# Patient Record
Sex: MALE | Race: WHITE | Employment: OTHER | URBAN - METROPOLITAN AREA
[De-identification: names, ages, dates, MRNs, and addresses within clinical notes are randomized per-mention and may not be internally consistent; named-entity substitution may affect disease eponyms.]

---

## 2017-04-21 PROBLEM — R19.7 DIARRHEA: Status: ACTIVE | Noted: 2017-04-21

## 2017-04-21 PROBLEM — E11.9 DIABETES MELLITUS (HCC): Status: ACTIVE | Noted: 2017-04-21

## 2017-04-21 PROBLEM — M06.9 RHEUMATOID ARTHRITIS (HCC): Status: ACTIVE | Noted: 2017-04-21

## 2017-04-21 PROBLEM — N18.30 CKD (CHRONIC KIDNEY DISEASE), STAGE III (HCC): Status: ACTIVE | Noted: 2017-04-21

## 2017-04-21 PROBLEM — J18.9 PNEUMONIA: Status: ACTIVE | Noted: 2017-04-21

## 2017-05-19 PROBLEM — R11.2 NAUSEA AND VOMITING: Status: ACTIVE | Noted: 2017-05-19

## 2017-05-19 PROBLEM — E86.0 DEHYDRATION: Status: ACTIVE | Noted: 2017-05-19

## 2017-05-19 PROBLEM — M62.82 RHABDOMYOLYSIS: Status: ACTIVE | Noted: 2017-05-19

## 2017-05-19 PROBLEM — J69.0 ASPIRATION PNEUMONIA (HCC): Status: ACTIVE | Noted: 2017-05-19

## 2017-05-23 PROBLEM — R13.10 DYSPHAGIA: Status: ACTIVE | Noted: 2017-05-23

## 2017-05-24 PROBLEM — K29.71 HEMORRHAGIC GASTRITIS: Status: ACTIVE | Noted: 2017-05-24

## 2017-05-24 PROBLEM — K21.00 HIATAL HERNIA WITH GERD AND ESOPHAGITIS: Status: ACTIVE | Noted: 2017-05-24

## 2017-05-24 PROBLEM — K29.80 DUODENITIS: Status: ACTIVE | Noted: 2017-05-24

## 2017-05-24 PROBLEM — K21.9 GERD (GASTROESOPHAGEAL REFLUX DISEASE): Status: ACTIVE | Noted: 2017-05-24

## 2017-05-24 PROBLEM — K44.9 HIATAL HERNIA WITH GERD AND ESOPHAGITIS: Status: ACTIVE | Noted: 2017-05-24

## 2018-05-14 ENCOUNTER — HOSPITAL ENCOUNTER (OUTPATIENT)
Age: 83
Discharge: HOME | End: 2018-05-16
Payer: MEDICARE

## 2018-05-14 ENCOUNTER — HOSPITAL ENCOUNTER (OUTPATIENT)
Dept: GENERAL RADIOLOGY | Age: 83
Discharge: HOME | End: 2018-05-16
Payer: MEDICARE

## 2018-05-14 DIAGNOSIS — R05.9 COUGH: ICD-10-CM

## 2018-05-14 PROCEDURE — 71046 X-RAY EXAM CHEST 2 VIEWS: CPT

## 2018-05-23 ENCOUNTER — HOSPITAL ENCOUNTER (OUTPATIENT)
Dept: CT IMAGING | Age: 83
Discharge: HOME | End: 2018-05-25
Payer: MEDICARE

## 2018-05-23 DIAGNOSIS — R91.8 LUNG MASS: ICD-10-CM

## 2018-05-23 PROCEDURE — 71270 CT THORAX DX C-/C+: CPT

## 2018-05-23 PROCEDURE — 6360000004 HC RX CONTRAST MEDICATION: Performed by: RADIOLOGY

## 2018-05-23 RX ORDER — SODIUM CHLORIDE 0.9 % (FLUSH) 0.9 %
10 SYRINGE (ML) INJECTION PRN
Status: DISCONTINUED | OUTPATIENT
Start: 2018-05-23 | End: 2018-05-26 | Stop reason: HOSPADM

## 2018-05-23 RX ADMIN — IOPAMIDOL 90 ML: 755 INJECTION, SOLUTION INTRAVENOUS at 16:26

## 2018-09-26 PROBLEM — E86.0 DEHYDRATION: Status: RESOLVED | Noted: 2017-05-19 | Resolved: 2018-09-26

## 2018-10-15 ENCOUNTER — HOSPITAL ENCOUNTER (OUTPATIENT)
Age: 83
Discharge: HOME | End: 2018-10-17
Payer: MEDICARE

## 2018-10-15 LAB — PROSTATE SPECIFIC ANTIGEN: 2.12 NG/ML (ref 0–4)

## 2018-10-15 PROCEDURE — G0103 PSA SCREENING: HCPCS

## 2019-01-18 ENCOUNTER — HOSPITAL ENCOUNTER (OUTPATIENT)
Age: 84
Discharge: HOME | End: 2019-01-18
Payer: COMMERCIAL

## 2019-01-18 LAB
ALBUMIN SERPL-MCNC: 3.8 G/DL (ref 3.5–5.2)
ALP BLD-CCNC: 76 U/L (ref 40–129)
ALT SERPL-CCNC: 17 U/L (ref 0–40)
ANION GAP SERPL CALCULATED.3IONS-SCNC: 15 MMOL/L (ref 7–16)
AST SERPL-CCNC: 18 U/L (ref 0–39)
BASOPHILS ABSOLUTE: 0.03 E9/L (ref 0–0.2)
BASOPHILS RELATIVE PERCENT: 0.2 % (ref 0–2)
BILIRUB SERPL-MCNC: 0.4 MG/DL (ref 0–1.2)
BUN BLDV-MCNC: 27 MG/DL (ref 8–23)
CALCIUM SERPL-MCNC: 9.3 MG/DL (ref 8.6–10.2)
CHLORIDE BLD-SCNC: 99 MMOL/L (ref 98–107)
CHOLESTEROL, TOTAL: 142 MG/DL (ref 0–199)
CO2: 26 MMOL/L (ref 22–29)
CREAT SERPL-MCNC: 1.2 MG/DL (ref 0.7–1.2)
EOSINOPHILS ABSOLUTE: 0.01 E9/L (ref 0.05–0.5)
EOSINOPHILS RELATIVE PERCENT: 0.1 % (ref 0–6)
GFR AFRICAN AMERICAN: >60
GFR NON-AFRICAN AMERICAN: 58 ML/MIN/1.73
GLUCOSE BLD-MCNC: 108 MG/DL (ref 74–99)
HBA1C MFR BLD: 7.2 % (ref 4–5.6)
HCT VFR BLD CALC: 47.4 % (ref 37–54)
HDLC SERPL-MCNC: 71 MG/DL
HEMOGLOBIN: 15.3 G/DL (ref 12.5–16.5)
IMMATURE GRANULOCYTES #: 0.08 E9/L
IMMATURE GRANULOCYTES %: 0.5 % (ref 0–5)
LDL CHOLESTEROL CALCULATED: 53 MG/DL (ref 0–99)
LYMPHOCYTES ABSOLUTE: 1.67 E9/L (ref 1.5–4)
LYMPHOCYTES RELATIVE PERCENT: 10.4 % (ref 20–42)
MCH RBC QN AUTO: 30.1 PG (ref 26–35)
MCHC RBC AUTO-ENTMCNC: 32.3 % (ref 32–34.5)
MCV RBC AUTO: 93.1 FL (ref 80–99.9)
MONOCYTES ABSOLUTE: 0.98 E9/L (ref 0.1–0.95)
MONOCYTES RELATIVE PERCENT: 6.1 % (ref 2–12)
NEUTROPHILS ABSOLUTE: 13.28 E9/L (ref 1.8–7.3)
NEUTROPHILS RELATIVE PERCENT: 82.7 % (ref 43–80)
PDW BLD-RTO: 13.3 FL (ref 11.5–15)
PLATELET # BLD: 276 E9/L (ref 130–450)
PMV BLD AUTO: 10.4 FL (ref 7–12)
POTASSIUM SERPL-SCNC: 4.3 MMOL/L (ref 3.5–5)
RBC # BLD: 5.09 E12/L (ref 3.8–5.8)
SODIUM BLD-SCNC: 140 MMOL/L (ref 132–146)
TOTAL PROTEIN: 6.4 G/DL (ref 6.4–8.3)
TRIGL SERPL-MCNC: 88 MG/DL (ref 0–149)
TSH SERPL DL<=0.05 MIU/L-ACNC: 1.97 UIU/ML (ref 0.27–4.2)
VLDLC SERPL CALC-MCNC: 18 MG/DL
WBC # BLD: 16.1 E9/L (ref 4.5–11.5)

## 2019-01-18 PROCEDURE — 85025 COMPLETE CBC W/AUTO DIFF WBC: CPT

## 2019-01-18 PROCEDURE — 80061 LIPID PANEL: CPT

## 2019-01-18 PROCEDURE — 84443 ASSAY THYROID STIM HORMONE: CPT

## 2019-01-18 PROCEDURE — 36415 COLL VENOUS BLD VENIPUNCTURE: CPT

## 2019-01-18 PROCEDURE — 80053 COMPREHEN METABOLIC PANEL: CPT

## 2019-01-18 PROCEDURE — 83036 HEMOGLOBIN GLYCOSYLATED A1C: CPT

## 2019-02-04 ENCOUNTER — HOSPITAL ENCOUNTER (OUTPATIENT)
Age: 84
Discharge: HOME | End: 2019-02-04
Payer: COMMERCIAL

## 2019-02-04 LAB
BASOPHILS ABSOLUTE: 0.06 E9/L (ref 0–0.2)
BASOPHILS RELATIVE PERCENT: 0.5 % (ref 0–2)
EOSINOPHILS ABSOLUTE: 0.35 E9/L (ref 0.05–0.5)
EOSINOPHILS RELATIVE PERCENT: 3 % (ref 0–6)
HCT VFR BLD CALC: 48.6 % (ref 37–54)
HEMOGLOBIN: 15.4 G/DL (ref 12.5–16.5)
IMMATURE GRANULOCYTES #: 0.04 E9/L
IMMATURE GRANULOCYTES %: 0.3 % (ref 0–5)
LYMPHOCYTES ABSOLUTE: 0.88 E9/L (ref 1.5–4)
LYMPHOCYTES RELATIVE PERCENT: 7.6 % (ref 20–42)
MCH RBC QN AUTO: 30 PG (ref 26–35)
MCHC RBC AUTO-ENTMCNC: 31.7 % (ref 32–34.5)
MCV RBC AUTO: 94.7 FL (ref 80–99.9)
MONOCYTES ABSOLUTE: 1.1 E9/L (ref 0.1–0.95)
MONOCYTES RELATIVE PERCENT: 9.5 % (ref 2–12)
NEUTROPHILS ABSOLUTE: 9.14 E9/L (ref 1.8–7.3)
NEUTROPHILS RELATIVE PERCENT: 79.1 % (ref 43–80)
PDW BLD-RTO: 13.2 FL (ref 11.5–15)
PLATELET # BLD: 248 E9/L (ref 130–450)
PMV BLD AUTO: 9.2 FL (ref 7–12)
RBC # BLD: 5.13 E12/L (ref 3.8–5.8)
WBC # BLD: 11.6 E9/L (ref 4.5–11.5)

## 2019-02-04 PROCEDURE — 85025 COMPLETE CBC W/AUTO DIFF WBC: CPT

## 2019-02-04 PROCEDURE — 36415 COLL VENOUS BLD VENIPUNCTURE: CPT

## 2019-05-03 ENCOUNTER — HOSPITAL ENCOUNTER (OUTPATIENT)
Age: 84
Discharge: HOME | End: 2019-05-03
Payer: COMMERCIAL

## 2019-05-03 LAB
ALBUMIN SERPL-MCNC: 3.6 G/DL (ref 3.5–5.2)
ALP BLD-CCNC: 78 U/L (ref 40–129)
ALT SERPL-CCNC: 13 U/L (ref 0–40)
ANION GAP SERPL CALCULATED.3IONS-SCNC: 10 MMOL/L (ref 7–16)
AST SERPL-CCNC: 15 U/L (ref 0–39)
BASOPHILS ABSOLUTE: 0.05 E9/L (ref 0–0.2)
BASOPHILS RELATIVE PERCENT: 0.4 % (ref 0–2)
BILIRUB SERPL-MCNC: 0.7 MG/DL (ref 0–1.2)
BUN BLDV-MCNC: 19 MG/DL (ref 8–23)
CALCIUM SERPL-MCNC: 9.1 MG/DL (ref 8.6–10.2)
CHLORIDE BLD-SCNC: 102 MMOL/L (ref 98–107)
CHOLESTEROL, TOTAL: 132 MG/DL (ref 0–199)
CO2: 28 MMOL/L (ref 22–29)
CREAT SERPL-MCNC: 1.4 MG/DL (ref 0.7–1.2)
EOSINOPHILS ABSOLUTE: 0.46 E9/L (ref 0.05–0.5)
EOSINOPHILS RELATIVE PERCENT: 3.9 % (ref 0–6)
GFR AFRICAN AMERICAN: 58
GFR NON-AFRICAN AMERICAN: 48 ML/MIN/1.73
GLUCOSE BLD-MCNC: 128 MG/DL (ref 74–99)
HBA1C MFR BLD: 7 % (ref 4–5.6)
HCT VFR BLD CALC: 46.2 % (ref 37–54)
HDLC SERPL-MCNC: 52 MG/DL
HEMOGLOBIN: 14.6 G/DL (ref 12.5–16.5)
IMMATURE GRANULOCYTES #: 0.03 E9/L
IMMATURE GRANULOCYTES %: 0.3 % (ref 0–5)
LDL CHOLESTEROL CALCULATED: 57 MG/DL (ref 0–99)
LYMPHOCYTES ABSOLUTE: 1.81 E9/L (ref 1.5–4)
LYMPHOCYTES RELATIVE PERCENT: 15.4 % (ref 20–42)
MCH RBC QN AUTO: 30.5 PG (ref 26–35)
MCHC RBC AUTO-ENTMCNC: 31.6 % (ref 32–34.5)
MCV RBC AUTO: 96.7 FL (ref 80–99.9)
MONOCYTES ABSOLUTE: 1.18 E9/L (ref 0.1–0.95)
MONOCYTES RELATIVE PERCENT: 10.1 % (ref 2–12)
NEUTROPHILS ABSOLUTE: 8.2 E9/L (ref 1.8–7.3)
NEUTROPHILS RELATIVE PERCENT: 69.9 % (ref 43–80)
PDW BLD-RTO: 13.2 FL (ref 11.5–15)
PLATELET # BLD: 235 E9/L (ref 130–450)
PMV BLD AUTO: 10.2 FL (ref 7–12)
POTASSIUM SERPL-SCNC: 4.3 MMOL/L (ref 3.5–5)
RBC # BLD: 4.78 E12/L (ref 3.8–5.8)
SODIUM BLD-SCNC: 140 MMOL/L (ref 132–146)
TOTAL PROTEIN: 6.5 G/DL (ref 6.4–8.3)
TRIGL SERPL-MCNC: 116 MG/DL (ref 0–149)
TSH SERPL DL<=0.05 MIU/L-ACNC: 2.79 UIU/ML (ref 0.27–4.2)
VLDLC SERPL CALC-MCNC: 23 MG/DL
WBC # BLD: 11.7 E9/L (ref 4.5–11.5)

## 2019-05-03 PROCEDURE — 84443 ASSAY THYROID STIM HORMONE: CPT

## 2019-05-03 PROCEDURE — 36415 COLL VENOUS BLD VENIPUNCTURE: CPT

## 2019-05-03 PROCEDURE — 85025 COMPLETE CBC W/AUTO DIFF WBC: CPT

## 2019-05-03 PROCEDURE — 80053 COMPREHEN METABOLIC PANEL: CPT

## 2019-05-03 PROCEDURE — 83036 HEMOGLOBIN GLYCOSYLATED A1C: CPT

## 2019-05-03 PROCEDURE — 80061 LIPID PANEL: CPT

## 2019-08-20 ENCOUNTER — HOSPITAL ENCOUNTER (OUTPATIENT)
Age: 84
Discharge: HOME | End: 2019-08-20
Payer: COMMERCIAL

## 2019-08-20 LAB
ALBUMIN SERPL-MCNC: 3.9 G/DL (ref 3.5–5.2)
ALP BLD-CCNC: 92 U/L (ref 40–129)
ALT SERPL-CCNC: 15 U/L (ref 0–40)
ANION GAP SERPL CALCULATED.3IONS-SCNC: 12 MMOL/L (ref 7–16)
AST SERPL-CCNC: 14 U/L (ref 0–39)
BASOPHILS ABSOLUTE: 0.06 E9/L (ref 0–0.2)
BASOPHILS RELATIVE PERCENT: 0.6 % (ref 0–2)
BILIRUB SERPL-MCNC: 0.6 MG/DL (ref 0–1.2)
BUN BLDV-MCNC: 22 MG/DL (ref 8–23)
CALCIUM SERPL-MCNC: 9.6 MG/DL (ref 8.6–10.2)
CHLORIDE BLD-SCNC: 104 MMOL/L (ref 98–107)
CHOLESTEROL, TOTAL: 151 MG/DL (ref 0–199)
CO2: 28 MMOL/L (ref 22–29)
CREAT SERPL-MCNC: 1.4 MG/DL (ref 0.7–1.2)
EOSINOPHILS ABSOLUTE: 0.45 E9/L (ref 0.05–0.5)
EOSINOPHILS RELATIVE PERCENT: 4.3 % (ref 0–6)
GFR AFRICAN AMERICAN: 58
GFR NON-AFRICAN AMERICAN: 48 ML/MIN/1.73
GLUCOSE BLD-MCNC: 156 MG/DL (ref 74–99)
HBA1C MFR BLD: 7.7 % (ref 4–5.6)
HCT VFR BLD CALC: 54.7 % (ref 37–54)
HDLC SERPL-MCNC: 57 MG/DL
HEMOGLOBIN: 17.2 G/DL (ref 12.5–16.5)
IMMATURE GRANULOCYTES #: 0.04 E9/L
IMMATURE GRANULOCYTES %: 0.4 % (ref 0–5)
LDL CHOLESTEROL CALCULATED: 71 MG/DL (ref 0–99)
LYMPHOCYTES ABSOLUTE: 1.94 E9/L (ref 1.5–4)
LYMPHOCYTES RELATIVE PERCENT: 18.4 % (ref 20–42)
MCH RBC QN AUTO: 30.3 PG (ref 26–35)
MCHC RBC AUTO-ENTMCNC: 31.4 % (ref 32–34.5)
MCV RBC AUTO: 96.3 FL (ref 80–99.9)
MONOCYTES ABSOLUTE: 0.97 E9/L (ref 0.1–0.95)
MONOCYTES RELATIVE PERCENT: 9.2 % (ref 2–12)
NEUTROPHILS ABSOLUTE: 7.1 E9/L (ref 1.8–7.3)
NEUTROPHILS RELATIVE PERCENT: 67.1 % (ref 43–80)
PDW BLD-RTO: 13.4 FL (ref 11.5–15)
PLATELET # BLD: 243 E9/L (ref 130–450)
PMV BLD AUTO: 10.2 FL (ref 7–12)
POTASSIUM SERPL-SCNC: 4.7 MMOL/L (ref 3.5–5)
RBC # BLD: 5.68 E12/L (ref 3.8–5.8)
SODIUM BLD-SCNC: 144 MMOL/L (ref 132–146)
TOTAL PROTEIN: 7.1 G/DL (ref 6.4–8.3)
TRIGL SERPL-MCNC: 115 MG/DL (ref 0–149)
TSH SERPL DL<=0.05 MIU/L-ACNC: 3.75 UIU/ML (ref 0.27–4.2)
VLDLC SERPL CALC-MCNC: 23 MG/DL
WBC # BLD: 10.6 E9/L (ref 4.5–11.5)

## 2019-08-20 PROCEDURE — 85025 COMPLETE CBC W/AUTO DIFF WBC: CPT

## 2019-08-20 PROCEDURE — 83036 HEMOGLOBIN GLYCOSYLATED A1C: CPT

## 2019-08-20 PROCEDURE — 84443 ASSAY THYROID STIM HORMONE: CPT

## 2019-08-20 PROCEDURE — 80053 COMPREHEN METABOLIC PANEL: CPT

## 2019-08-20 PROCEDURE — 36415 COLL VENOUS BLD VENIPUNCTURE: CPT

## 2019-08-20 PROCEDURE — 80061 LIPID PANEL: CPT

## 2019-11-20 ENCOUNTER — OFFICE VISIT (OUTPATIENT)
Dept: FAMILY MEDICINE CLINIC | Age: 84
End: 2019-11-20
Payer: COMMERCIAL

## 2019-11-20 ENCOUNTER — HOSPITAL ENCOUNTER (OUTPATIENT)
Age: 84
Discharge: HOME | End: 2019-11-20
Payer: COMMERCIAL

## 2019-11-20 VITALS
TEMPERATURE: 97.9 F | SYSTOLIC BLOOD PRESSURE: 128 MMHG | OXYGEN SATURATION: 94 % | WEIGHT: 221 LBS | HEIGHT: 72 IN | BODY MASS INDEX: 29.93 KG/M2 | DIASTOLIC BLOOD PRESSURE: 80 MMHG | HEART RATE: 70 BPM | RESPIRATION RATE: 16 BRPM

## 2019-11-20 DIAGNOSIS — R31.9 URINARY TRACT INFECTION WITH HEMATURIA, SITE UNSPECIFIED: ICD-10-CM

## 2019-11-20 DIAGNOSIS — N39.0 URINARY TRACT INFECTION WITH HEMATURIA, SITE UNSPECIFIED: ICD-10-CM

## 2019-11-20 DIAGNOSIS — N18.30 CHRONIC KIDNEY FAILURE, STAGE 3 (MODERATE) (HCC): ICD-10-CM

## 2019-11-20 DIAGNOSIS — N18.30 CHRONIC KIDNEY FAILURE, STAGE 3 (MODERATE) (HCC): Primary | ICD-10-CM

## 2019-11-20 LAB
ALBUMIN SERPL-MCNC: 3.8 G/DL (ref 3.5–5.2)
ALP BLD-CCNC: 101 U/L (ref 40–129)
ALT SERPL-CCNC: 18 U/L (ref 0–40)
ANION GAP SERPL CALCULATED.3IONS-SCNC: 12 MMOL/L (ref 7–16)
AST SERPL-CCNC: 21 U/L (ref 0–39)
BASOPHILS ABSOLUTE: 0.06 E9/L (ref 0–0.2)
BASOPHILS RELATIVE PERCENT: 0.4 % (ref 0–2)
BILIRUB SERPL-MCNC: 0.9 MG/DL (ref 0–1.2)
BILIRUBIN, POC: NORMAL
BLOOD URINE, POC: NORMAL
BUN BLDV-MCNC: 21 MG/DL (ref 8–23)
CALCIUM SERPL-MCNC: 9.8 MG/DL (ref 8.6–10.2)
CHLORIDE BLD-SCNC: 100 MMOL/L (ref 98–107)
CLARITY, POC: NORMAL
CO2: 29 MMOL/L (ref 22–29)
COLOR, POC: NORMAL
CREAT SERPL-MCNC: 1.3 MG/DL (ref 0.7–1.2)
EOSINOPHILS ABSOLUTE: 0.21 E9/L (ref 0.05–0.5)
EOSINOPHILS RELATIVE PERCENT: 1.2 % (ref 0–6)
GFR AFRICAN AMERICAN: >60
GFR NON-AFRICAN AMERICAN: 52 ML/MIN/1.73
GLUCOSE BLD-MCNC: 188 MG/DL (ref 74–99)
GLUCOSE URINE, POC: NORMAL
HCT VFR BLD CALC: 50.1 % (ref 37–54)
HEMOGLOBIN: 15.5 G/DL (ref 12.5–16.5)
IMMATURE GRANULOCYTES #: 0.07 E9/L
IMMATURE GRANULOCYTES %: 0.4 % (ref 0–5)
KETONES, POC: NORMAL
LEUKOCYTE EST, POC: NORMAL
LYMPHOCYTES ABSOLUTE: 2.18 E9/L (ref 1.5–4)
LYMPHOCYTES RELATIVE PERCENT: 12.9 % (ref 20–42)
MCH RBC QN AUTO: 29.8 PG (ref 26–35)
MCHC RBC AUTO-ENTMCNC: 30.9 % (ref 32–34.5)
MCV RBC AUTO: 96.2 FL (ref 80–99.9)
MONOCYTES ABSOLUTE: 1.19 E9/L (ref 0.1–0.95)
MONOCYTES RELATIVE PERCENT: 7 % (ref 2–12)
NEUTROPHILS ABSOLUTE: 13.18 E9/L (ref 1.8–7.3)
NEUTROPHILS RELATIVE PERCENT: 78.1 % (ref 43–80)
NITRITE, POC: NORMAL
PDW BLD-RTO: 13.1 FL (ref 11.5–15)
PH, POC: 7
PLATELET # BLD: 267 E9/L (ref 130–450)
PMV BLD AUTO: 9.7 FL (ref 7–12)
POTASSIUM SERPL-SCNC: 4.5 MMOL/L (ref 3.5–5)
PROTEIN, POC: NORMAL
RBC # BLD: 5.21 E12/L (ref 3.8–5.8)
SODIUM BLD-SCNC: 141 MMOL/L (ref 132–146)
SPECIFIC GRAVITY, POC: 1.02
TOTAL PROTEIN: 7 G/DL (ref 6.4–8.3)
UROBILINOGEN, POC: NORMAL
WBC # BLD: 16.9 E9/L (ref 4.5–11.5)

## 2019-11-20 PROCEDURE — 81002 URINALYSIS NONAUTO W/O SCOPE: CPT | Performed by: NURSE PRACTITIONER

## 2019-11-20 PROCEDURE — 85025 COMPLETE CBC W/AUTO DIFF WBC: CPT

## 2019-11-20 PROCEDURE — 36415 COLL VENOUS BLD VENIPUNCTURE: CPT

## 2019-11-20 PROCEDURE — 99213 OFFICE O/P EST LOW 20 MIN: CPT | Performed by: NURSE PRACTITIONER

## 2019-11-20 PROCEDURE — 80053 COMPREHEN METABOLIC PANEL: CPT

## 2019-12-05 ENCOUNTER — HOSPITAL ENCOUNTER (OUTPATIENT)
Age: 84
Discharge: HOME | End: 2019-12-05
Payer: COMMERCIAL

## 2019-12-05 LAB
ALBUMIN SERPL-MCNC: 3.7 G/DL (ref 3.5–5.2)
ALP BLD-CCNC: 91 U/L (ref 40–129)
ALT SERPL-CCNC: 17 U/L (ref 0–40)
ANION GAP SERPL CALCULATED.3IONS-SCNC: 9 MMOL/L (ref 7–16)
AST SERPL-CCNC: 16 U/L (ref 0–39)
BASOPHILS ABSOLUTE: 0.07 E9/L (ref 0–0.2)
BASOPHILS RELATIVE PERCENT: 0.6 % (ref 0–2)
BILIRUB SERPL-MCNC: 0.6 MG/DL (ref 0–1.2)
BUN BLDV-MCNC: 20 MG/DL (ref 8–23)
CALCIUM SERPL-MCNC: 9.6 MG/DL (ref 8.6–10.2)
CHLORIDE BLD-SCNC: 101 MMOL/L (ref 98–107)
CHOLESTEROL, TOTAL: 150 MG/DL (ref 0–199)
CO2: 31 MMOL/L (ref 22–29)
CREAT SERPL-MCNC: 1.3 MG/DL (ref 0.7–1.2)
EOSINOPHILS ABSOLUTE: 0.39 E9/L (ref 0.05–0.5)
EOSINOPHILS RELATIVE PERCENT: 3.2 % (ref 0–6)
GFR AFRICAN AMERICAN: >60
GFR NON-AFRICAN AMERICAN: 52 ML/MIN/1.73
GLUCOSE BLD-MCNC: 210 MG/DL (ref 74–99)
HBA1C MFR BLD: 8.6 % (ref 4–5.6)
HCT VFR BLD CALC: 51.6 % (ref 37–54)
HDLC SERPL-MCNC: 58 MG/DL
HEMOGLOBIN: 16 G/DL (ref 12.5–16.5)
IMMATURE GRANULOCYTES #: 0.04 E9/L
IMMATURE GRANULOCYTES %: 0.3 % (ref 0–5)
LDL CHOLESTEROL CALCULATED: 62 MG/DL (ref 0–99)
LYMPHOCYTES ABSOLUTE: 1.84 E9/L (ref 1.5–4)
LYMPHOCYTES RELATIVE PERCENT: 15.3 % (ref 20–42)
MCH RBC QN AUTO: 29.8 PG (ref 26–35)
MCHC RBC AUTO-ENTMCNC: 31 % (ref 32–34.5)
MCV RBC AUTO: 96.1 FL (ref 80–99.9)
MONOCYTES ABSOLUTE: 0.88 E9/L (ref 0.1–0.95)
MONOCYTES RELATIVE PERCENT: 7.3 % (ref 2–12)
NEUTROPHILS ABSOLUTE: 8.83 E9/L (ref 1.8–7.3)
NEUTROPHILS RELATIVE PERCENT: 73.3 % (ref 43–80)
PDW BLD-RTO: 13.2 FL (ref 11.5–15)
PLATELET # BLD: 250 E9/L (ref 130–450)
PMV BLD AUTO: 10.1 FL (ref 7–12)
POTASSIUM SERPL-SCNC: 4.5 MMOL/L (ref 3.5–5)
RBC # BLD: 5.37 E12/L (ref 3.8–5.8)
SODIUM BLD-SCNC: 141 MMOL/L (ref 132–146)
TOTAL PROTEIN: 6.6 G/DL (ref 6.4–8.3)
TRIGL SERPL-MCNC: 150 MG/DL (ref 0–149)
TSH SERPL DL<=0.05 MIU/L-ACNC: 3.48 UIU/ML (ref 0.27–4.2)
VLDLC SERPL CALC-MCNC: 30 MG/DL
WBC # BLD: 12.1 E9/L (ref 4.5–11.5)

## 2019-12-05 PROCEDURE — 36415 COLL VENOUS BLD VENIPUNCTURE: CPT

## 2019-12-05 PROCEDURE — 80061 LIPID PANEL: CPT

## 2019-12-05 PROCEDURE — 83036 HEMOGLOBIN GLYCOSYLATED A1C: CPT

## 2019-12-05 PROCEDURE — 84443 ASSAY THYROID STIM HORMONE: CPT

## 2019-12-05 PROCEDURE — 85025 COMPLETE CBC W/AUTO DIFF WBC: CPT

## 2019-12-05 PROCEDURE — 80053 COMPREHEN METABOLIC PANEL: CPT

## 2019-12-09 ENCOUNTER — HOSPITAL ENCOUNTER (OUTPATIENT)
Age: 84
Discharge: HOME | End: 2019-12-11
Payer: COMMERCIAL

## 2019-12-09 LAB — PROSTATE SPECIFIC ANTIGEN: 2.24 NG/ML (ref 0–4)

## 2019-12-09 PROCEDURE — 84153 ASSAY OF PSA TOTAL: CPT

## 2020-01-10 ENCOUNTER — APPOINTMENT (OUTPATIENT)
Dept: GENERAL RADIOLOGY | Age: 85
DRG: 193 | End: 2020-01-10
Payer: MEDICARE

## 2020-01-10 ENCOUNTER — HOSPITAL ENCOUNTER (INPATIENT)
Age: 85
LOS: 7 days | Discharge: HOME | DRG: 193 | End: 2020-01-17
Attending: EMERGENCY MEDICINE | Admitting: INTERNAL MEDICINE
Payer: MEDICARE

## 2020-01-10 ENCOUNTER — APPOINTMENT (OUTPATIENT)
Dept: CT IMAGING | Age: 85
DRG: 193 | End: 2020-01-10
Payer: MEDICARE

## 2020-01-10 LAB
ADENOVIRUS BY PCR: NOT DETECTED
ALBUMIN SERPL-MCNC: 3.2 G/DL (ref 3.5–5.2)
ALP BLD-CCNC: 89 U/L (ref 40–129)
ALT SERPL-CCNC: 19 U/L (ref 0–40)
ANION GAP SERPL CALCULATED.3IONS-SCNC: 13 MMOL/L (ref 7–16)
APTT: 22.9 SEC (ref 24.5–35.1)
AST SERPL-CCNC: 36 U/L (ref 0–39)
B.E.: -2.9 MMOL/L (ref -3–3)
BILIRUB SERPL-MCNC: 0.6 MG/DL (ref 0–1.2)
BORDETELLA PARAPERTUSSIS BY PCR: NOT DETECTED
BORDETELLA PERTUSSIS BY PCR: NOT DETECTED
BUN BLDV-MCNC: 23 MG/DL (ref 8–23)
C-REACTIVE PROTEIN: 4.1 MG/DL (ref 0–0.4)
CALCIUM SERPL-MCNC: 8.1 MG/DL (ref 8.6–10.2)
CHLAMYDOPHILIA PNEUMONIAE BY PCR: NOT DETECTED
CHLORIDE BLD-SCNC: 102 MMOL/L (ref 98–107)
CO2: 21 MMOL/L (ref 22–29)
COHB: 0.9 % (ref 0–1.5)
CORONAVIRUS 229E BY PCR: NOT DETECTED
CORONAVIRUS HKU1 BY PCR: NOT DETECTED
CORONAVIRUS NL63 BY PCR: NOT DETECTED
CORONAVIRUS OC43 BY PCR: NOT DETECTED
CREAT SERPL-MCNC: 1.2 MG/DL (ref 0.7–1.2)
CRITICAL: NORMAL
DATE ANALYZED: NORMAL
DATE OF COLLECTION: NORMAL
EKG ATRIAL RATE: 121 BPM
EKG P AXIS: 65 DEGREES
EKG P-R INTERVAL: 176 MS
EKG Q-T INTERVAL: 316 MS
EKG QRS DURATION: 84 MS
EKG QTC CALCULATION (BAZETT): 448 MS
EKG R AXIS: 13 DEGREES
EKG T AXIS: 50 DEGREES
EKG VENTRICULAR RATE: 121 BPM
GFR AFRICAN AMERICAN: >60
GFR NON-AFRICAN AMERICAN: 57 ML/MIN/1.73
GLUCOSE BLD-MCNC: 303 MG/DL (ref 74–99)
HBA1C MFR BLD: 8.3 % (ref 4–5.6)
HCO3: 22 MMOL/L (ref 22–26)
HCT VFR BLD CALC: 49.4 % (ref 37–54)
HEMOGLOBIN: 15.6 G/DL (ref 12.5–16.5)
HHB: 4.5 % (ref 0–5)
HUMAN METAPNEUMOVIRUS BY PCR: NOT DETECTED
HUMAN RHINOVIRUS/ENTEROVIRUS BY PCR: NOT DETECTED
INFLUENZA A BY PCR: NOT DETECTED
INFLUENZA B BY PCR: NOT DETECTED
INR BLD: 1.1
LAB: NORMAL
LACTIC ACID: 2.8 MMOL/L (ref 0.5–2.2)
LACTIC ACID: 3.6 MMOL/L (ref 0.5–2.2)
Lab: NORMAL
MCH RBC QN AUTO: 30.2 PG (ref 26–35)
MCHC RBC AUTO-ENTMCNC: 31.6 % (ref 32–34.5)
MCV RBC AUTO: 95.6 FL (ref 80–99.9)
METER GLUCOSE: 305 MG/DL (ref 74–99)
METER GLUCOSE: 408 MG/DL (ref 74–99)
METHB: 0.3 % (ref 0–1.5)
MODE: NORMAL
MYCOPLASMA PNEUMONIAE BY PCR: NOT DETECTED
O2 CONTENT: 21.5 ML/DL
O2 SATURATION: 95.4 % (ref 92–98.5)
O2HB: 94.3 % (ref 94–97)
OPERATOR ID: NORMAL
PARAINFLUENZA VIRUS 1 BY PCR: NOT DETECTED
PARAINFLUENZA VIRUS 2 BY PCR: NOT DETECTED
PARAINFLUENZA VIRUS 3 BY PCR: NOT DETECTED
PARAINFLUENZA VIRUS 4 BY PCR: NOT DETECTED
PATIENT TEMP: 37 C
PCO2: 38.9 MMHG (ref 35–45)
PDW BLD-RTO: 13.2 FL (ref 11.5–15)
PH BLOOD GAS: 7.37 (ref 7.35–7.45)
PLATELET # BLD: 275 E9/L (ref 130–450)
PMV BLD AUTO: 10.3 FL (ref 7–12)
PO2: 78.2 MMHG (ref 60–100)
POTASSIUM SERPL-SCNC: 4.8 MMOL/L (ref 3.5–5)
PRO-BNP: 64 PG/ML (ref 0–450)
PROCALCITONIN: 0.28 NG/ML (ref 0–0.08)
PROTHROMBIN TIME: 12.6 SEC (ref 9.3–12.4)
RBC # BLD: 5.17 E12/L (ref 3.8–5.8)
RESPIRATORY SYNCYTIAL VIRUS BY PCR: NOT DETECTED
SEDIMENTATION RATE, ERYTHROCYTE: 20 MM/HR (ref 0–15)
SODIUM BLD-SCNC: 136 MMOL/L (ref 132–146)
SOURCE, BLOOD GAS: NORMAL
THB: 16.2 G/DL (ref 11.5–16.5)
TIME ANALYZED: 1248
TOTAL PROTEIN: 6.4 G/DL (ref 6.4–8.3)
TROPONIN: <0.01 NG/ML (ref 0–0.03)
WBC # BLD: 26.9 E9/L (ref 4.5–11.5)

## 2020-01-10 PROCEDURE — 2580000003 HC RX 258: Performed by: RADIOLOGY

## 2020-01-10 PROCEDURE — 84484 ASSAY OF TROPONIN QUANT: CPT

## 2020-01-10 PROCEDURE — 82805 BLOOD GASES W/O2 SATURATION: CPT

## 2020-01-10 PROCEDURE — 85651 RBC SED RATE NONAUTOMATED: CPT

## 2020-01-10 PROCEDURE — 6360000002 HC RX W HCPCS: Performed by: INTERNAL MEDICINE

## 2020-01-10 PROCEDURE — 87040 BLOOD CULTURE FOR BACTERIA: CPT

## 2020-01-10 PROCEDURE — 2580000003 HC RX 258: Performed by: EMERGENCY MEDICINE

## 2020-01-10 PROCEDURE — 2580000003 HC RX 258: Performed by: INTERNAL MEDICINE

## 2020-01-10 PROCEDURE — 71045 X-RAY EXAM CHEST 1 VIEW: CPT

## 2020-01-10 PROCEDURE — 6370000000 HC RX 637 (ALT 250 FOR IP): Performed by: INTERNAL MEDICINE

## 2020-01-10 PROCEDURE — 6360000002 HC RX W HCPCS: Performed by: EMERGENCY MEDICINE

## 2020-01-10 PROCEDURE — 93010 ELECTROCARDIOGRAM REPORT: CPT | Performed by: INTERNAL MEDICINE

## 2020-01-10 PROCEDURE — 36415 COLL VENOUS BLD VENIPUNCTURE: CPT

## 2020-01-10 PROCEDURE — 96365 THER/PROPH/DIAG IV INF INIT: CPT

## 2020-01-10 PROCEDURE — 6370000000 HC RX 637 (ALT 250 FOR IP): Performed by: EMERGENCY MEDICINE

## 2020-01-10 PROCEDURE — 84145 PROCALCITONIN (PCT): CPT

## 2020-01-10 PROCEDURE — 96375 TX/PRO/DX INJ NEW DRUG ADDON: CPT

## 2020-01-10 PROCEDURE — 99291 CRITICAL CARE FIRST HOUR: CPT

## 2020-01-10 PROCEDURE — 83605 ASSAY OF LACTIC ACID: CPT

## 2020-01-10 PROCEDURE — 87450 HC DIRECT STREP B ANTIGEN: CPT

## 2020-01-10 PROCEDURE — 94640 AIRWAY INHALATION TREATMENT: CPT

## 2020-01-10 PROCEDURE — 94664 DEMO&/EVAL PT USE INHALER: CPT

## 2020-01-10 PROCEDURE — 71275 CT ANGIOGRAPHY CHEST: CPT

## 2020-01-10 PROCEDURE — 6360000004 HC RX CONTRAST MEDICATION: Performed by: RADIOLOGY

## 2020-01-10 PROCEDURE — 93005 ELECTROCARDIOGRAM TRACING: CPT | Performed by: EMERGENCY MEDICINE

## 2020-01-10 PROCEDURE — 2060000000 HC ICU INTERMEDIATE R&B

## 2020-01-10 PROCEDURE — 85610 PROTHROMBIN TIME: CPT

## 2020-01-10 PROCEDURE — 85730 THROMBOPLASTIN TIME PARTIAL: CPT

## 2020-01-10 PROCEDURE — 85027 COMPLETE CBC AUTOMATED: CPT

## 2020-01-10 PROCEDURE — 80053 COMPREHEN METABOLIC PANEL: CPT

## 2020-01-10 PROCEDURE — 82962 GLUCOSE BLOOD TEST: CPT

## 2020-01-10 PROCEDURE — 83036 HEMOGLOBIN GLYCOSYLATED A1C: CPT

## 2020-01-10 PROCEDURE — 94761 N-INVAS EAR/PLS OXIMETRY MLT: CPT

## 2020-01-10 PROCEDURE — 83880 ASSAY OF NATRIURETIC PEPTIDE: CPT

## 2020-01-10 PROCEDURE — 86140 C-REACTIVE PROTEIN: CPT

## 2020-01-10 PROCEDURE — 0100U HC RESPIRPTHGN MULT REV TRANS & AMP PRB TECH 21 TRGT: CPT

## 2020-01-10 RX ORDER — FINASTERIDE 5 MG/1
5 TABLET, FILM COATED ORAL DAILY
Status: DISCONTINUED | OUTPATIENT
Start: 2020-01-10 | End: 2020-01-17 | Stop reason: HOSPADM

## 2020-01-10 RX ORDER — DEXTROSE MONOHYDRATE 25 G/50ML
12.5 INJECTION, SOLUTION INTRAVENOUS PRN
Status: DISCONTINUED | OUTPATIENT
Start: 2020-01-10 | End: 2020-01-17 | Stop reason: HOSPADM

## 2020-01-10 RX ORDER — DOXYCYCLINE HYCLATE 100 MG/1
100 CAPSULE ORAL ONCE
Status: COMPLETED | OUTPATIENT
Start: 2020-01-10 | End: 2020-01-10

## 2020-01-10 RX ORDER — FUROSEMIDE 20 MG/1
20 TABLET ORAL
Status: DISCONTINUED | OUTPATIENT
Start: 2020-01-13 | End: 2020-01-17 | Stop reason: HOSPADM

## 2020-01-10 RX ORDER — INSULIN GLARGINE 100 [IU]/ML
10 INJECTION, SOLUTION SUBCUTANEOUS NIGHTLY
Status: DISCONTINUED | OUTPATIENT
Start: 2020-01-10 | End: 2020-01-11

## 2020-01-10 RX ORDER — METHYLPREDNISOLONE SODIUM SUCCINATE 125 MG/2ML
125 INJECTION, POWDER, LYOPHILIZED, FOR SOLUTION INTRAMUSCULAR; INTRAVENOUS ONCE
Status: COMPLETED | OUTPATIENT
Start: 2020-01-10 | End: 2020-01-10

## 2020-01-10 RX ORDER — ACETAMINOPHEN 325 MG/1
650 TABLET ORAL EVERY 6 HOURS PRN
Status: DISCONTINUED | OUTPATIENT
Start: 2020-01-10 | End: 2020-01-17 | Stop reason: HOSPADM

## 2020-01-10 RX ORDER — IPRATROPIUM BROMIDE AND ALBUTEROL SULFATE 2.5; .5 MG/3ML; MG/3ML
1 SOLUTION RESPIRATORY (INHALATION)
Status: COMPLETED | OUTPATIENT
Start: 2020-01-10 | End: 2020-01-10

## 2020-01-10 RX ORDER — DEXTROSE MONOHYDRATE 50 MG/ML
100 INJECTION, SOLUTION INTRAVENOUS PRN
Status: DISCONTINUED | OUTPATIENT
Start: 2020-01-10 | End: 2020-01-17 | Stop reason: HOSPADM

## 2020-01-10 RX ORDER — ROSUVASTATIN CALCIUM 10 MG/1
10 TABLET, COATED ORAL DAILY
Status: DISCONTINUED | OUTPATIENT
Start: 2020-01-11 | End: 2020-01-17 | Stop reason: HOSPADM

## 2020-01-10 RX ORDER — SODIUM CHLORIDE 0.9 % (FLUSH) 0.9 %
10 SYRINGE (ML) INJECTION ONCE
Status: COMPLETED | OUTPATIENT
Start: 2020-01-10 | End: 2020-01-10

## 2020-01-10 RX ORDER — ONDANSETRON 2 MG/ML
4 INJECTION INTRAMUSCULAR; INTRAVENOUS EVERY 6 HOURS PRN
Status: DISCONTINUED | OUTPATIENT
Start: 2020-01-10 | End: 2020-01-17 | Stop reason: HOSPADM

## 2020-01-10 RX ORDER — SODIUM CHLORIDE 0.9 % (FLUSH) 0.9 %
10 SYRINGE (ML) INJECTION EVERY 12 HOURS SCHEDULED
Status: DISCONTINUED | OUTPATIENT
Start: 2020-01-10 | End: 2020-01-17 | Stop reason: HOSPADM

## 2020-01-10 RX ORDER — ASPIRIN 81 MG/1
81 TABLET, CHEWABLE ORAL DAILY
Status: DISCONTINUED | OUTPATIENT
Start: 2020-01-11 | End: 2020-01-17 | Stop reason: HOSPADM

## 2020-01-10 RX ORDER — NICOTINE POLACRILEX 4 MG
15 LOZENGE BUCCAL PRN
Status: DISCONTINUED | OUTPATIENT
Start: 2020-01-10 | End: 2020-01-17 | Stop reason: HOSPADM

## 2020-01-10 RX ORDER — DOXYCYCLINE HYCLATE 100 MG/1
100 CAPSULE ORAL EVERY 12 HOURS SCHEDULED
Status: DISCONTINUED | OUTPATIENT
Start: 2020-01-10 | End: 2020-01-17 | Stop reason: HOSPADM

## 2020-01-10 RX ORDER — 0.9 % SODIUM CHLORIDE 0.9 %
30 INTRAVENOUS SOLUTION INTRAVENOUS ONCE
Status: COMPLETED | OUTPATIENT
Start: 2020-01-10 | End: 2020-01-10

## 2020-01-10 RX ORDER — SODIUM CHLORIDE 9 MG/ML
INJECTION, SOLUTION INTRAVENOUS CONTINUOUS
Status: DISCONTINUED | OUTPATIENT
Start: 2020-01-10 | End: 2020-01-17 | Stop reason: HOSPADM

## 2020-01-10 RX ORDER — SODIUM CHLORIDE 0.9 % (FLUSH) 0.9 %
10 SYRINGE (ML) INJECTION PRN
Status: DISCONTINUED | OUTPATIENT
Start: 2020-01-10 | End: 2020-01-17 | Stop reason: HOSPADM

## 2020-01-10 RX ORDER — ALBUTEROL SULFATE 2.5 MG/3ML
2.5 SOLUTION RESPIRATORY (INHALATION) EVERY 6 HOURS
Status: DISCONTINUED | OUTPATIENT
Start: 2020-01-10 | End: 2020-01-11

## 2020-01-10 RX ORDER — 0.9 % SODIUM CHLORIDE 0.9 %
30 INTRAVENOUS SOLUTION INTRAVENOUS ONCE
Status: DISCONTINUED | OUTPATIENT
Start: 2020-01-10 | End: 2020-01-10 | Stop reason: SDUPTHER

## 2020-01-10 RX ORDER — ACETAMINOPHEN 325 MG/1
650 TABLET ORAL EVERY 6 HOURS PRN
COMMUNITY

## 2020-01-10 RX ADMIN — IOPAMIDOL 75 ML: 755 INJECTION, SOLUTION INTRAVENOUS at 14:13

## 2020-01-10 RX ADMIN — ALBUTEROL SULFATE 2.5 MG: 2.5 SOLUTION RESPIRATORY (INHALATION) at 18:40

## 2020-01-10 RX ADMIN — CEFTRIAXONE SODIUM 1 G: 1 INJECTION, POWDER, FOR SOLUTION INTRAMUSCULAR; INTRAVENOUS at 14:57

## 2020-01-10 RX ADMIN — IPRATROPIUM BROMIDE AND ALBUTEROL SULFATE 1 AMPULE: .5; 3 SOLUTION RESPIRATORY (INHALATION) at 12:50

## 2020-01-10 RX ADMIN — DOXYCYCLINE HYCLATE 100 MG: 100 CAPSULE ORAL at 21:54

## 2020-01-10 RX ADMIN — FINASTERIDE 5 MG: 5 TABLET, FILM COATED ORAL at 21:54

## 2020-01-10 RX ADMIN — Medication 10 ML: at 14:13

## 2020-01-10 RX ADMIN — INSULIN GLARGINE 10 UNITS: 100 INJECTION, SOLUTION SUBCUTANEOUS at 21:54

## 2020-01-10 RX ADMIN — SODIUM CHLORIDE: 9 INJECTION, SOLUTION INTRAVENOUS at 20:15

## 2020-01-10 RX ADMIN — INSULIN LISPRO 6 UNITS: 100 INJECTION, SOLUTION INTRAVENOUS; SUBCUTANEOUS at 21:53

## 2020-01-10 RX ADMIN — SODIUM CHLORIDE 3006 ML: 9 INJECTION, SOLUTION INTRAVENOUS at 15:32

## 2020-01-10 RX ADMIN — INSULIN LISPRO 8 UNITS: 100 INJECTION, SOLUTION INTRAVENOUS; SUBCUTANEOUS at 19:41

## 2020-01-10 RX ADMIN — METHYLPREDNISOLONE SODIUM SUCCINATE 125 MG: 125 INJECTION, POWDER, FOR SOLUTION INTRAMUSCULAR; INTRAVENOUS at 13:34

## 2020-01-10 RX ADMIN — ALBUTEROL SULFATE 2.5 MG: 2.5 SOLUTION RESPIRATORY (INHALATION) at 23:53

## 2020-01-10 RX ADMIN — IPRATROPIUM BROMIDE AND ALBUTEROL SULFATE 1 AMPULE: .5; 3 SOLUTION RESPIRATORY (INHALATION) at 12:59

## 2020-01-10 RX ADMIN — ENOXAPARIN SODIUM 40 MG: 40 INJECTION SUBCUTANEOUS at 19:43

## 2020-01-10 RX ADMIN — DOXYCYCLINE HYCLATE 100 MG: 100 CAPSULE ORAL at 14:54

## 2020-01-10 ASSESSMENT — PAIN SCALES - GENERAL
PAINLEVEL_OUTOF10: 0
PAINLEVEL_OUTOF10: 0

## 2020-01-10 NOTE — ED NOTES
Bed: 05  Expected date:   Expected time:   Means of arrival:   Comments:  [address] jose Juarez RN  01/10/20 2655

## 2020-01-10 NOTE — ED PROVIDER NOTES
HPI:  1/10/20, Time: 12:46 PM         Nika Alejo is a 80 y.o. male presenting to the ED for SOB NP cough , beginning one day ago. The complaint has been persistent, moderate in severity, and worsened by nothing. Patient's history was obtained from his son and wife. Patient is hard of hearing unable to provide details. Family states he has had nonproductive cough subjective fever and chills. No recent hospitalizations. He does suffer from COPD. He sees Dr. Lianet Waldrop. He did get flu vaccination last year. Son stated he was weak could not get out of bed this morning. He was complaining of the generalized weakness and shortness of breath. He is on no supplemental oxygen at home however he was hypoxic for EMS. He was placed on several liters nasal cannula upon arrival.  No nausea vomiting erika pain. Denies pain in his legs history of renal thrombus embolism. He has no neurological complaints. Denies hematuria. ROS:   Pertinent positives and negatives are stated within HPI, all other systems reviewed and are negative.  --------------------------------------------- PAST HISTORY ---------------------------------------------  Past Medical History:  has a past medical history of Arthritis, Hearing aid worn, and Hyperlipidemia. Past Surgical History:  has a past surgical history that includes Colonoscopy ([id number]); Cholecystectomy; hernia repair; Colonoscopy (4/4/2016); and Upper gastrointestinal endoscopy (05/23/2017). Social History:  reports that he has never smoked. He has never used smokeless tobacco. He reports that he does not drink alcohol or use drugs. Family History: family history includes Cancer in his father; Heart Disease in his brother; Heart Failure in his mother and sister. The patients home medications have been reviewed. Allergies: Patient has no known allergies.     ---------------------------------------------------PHYSICAL by PCR Not Detected Not Detected    Influenza A by PCR Not Detected Not Detected    Influenza B by PCR Not Detected Not Detected    Mycoplasma pneumoniae by PCR Not Detected Not Detected    Parainfluenza Virus 1 by PCR Not Detected Not Detected    Parainfluenza Virus 2 by PCR Not Detected Not Detected    Parainfluenza Virus 3 by PCR Not Detected Not Detected    Parainfluenza Virus 4 by PCR Not Detected Not Detected    Respiratory Syncytial Virus by PCR Not Detected Not Detected   Troponin   Result Value Ref Range    Troponin <0.01 0.00 - 0.03 ng/mL   APTT   Result Value Ref Range    aPTT 22.9 (L) 24.5 - 35.1 sec   Protime-INR   Result Value Ref Range    Protime 12.6 (H) 9.3 - 12.4 sec    INR 1.1    Brain Natriuretic Peptide   Result Value Ref Range    Pro-BNP 64 0 - 450 pg/mL   CBC   Result Value Ref Range    WBC 26.9 (H) 4.5 - 11.5 E9/L    RBC 5.17 3.80 - 5.80 E12/L    Hemoglobin 15.6 12.5 - 16.5 g/dL    Hematocrit 49.4 37.0 - 54.0 %    MCV 95.6 80.0 - 99.9 fL    MCH 30.2 26.0 - 35.0 pg    MCHC 31.6 (L) 32.0 - 34.5 %    RDW 13.2 11.5 - 15.0 fL    Platelets 251 100 - 426 E9/L    MPV 10.3 7.0 - 12.0 fL   Comprehensive Metabolic Panel   Result Value Ref Range    Sodium 136 132 - 146 mmol/L    Potassium 4.8 3.5 - 5.0 mmol/L    Chloride 102 98 - 107 mmol/L    CO2 21 (L) 22 - 29 mmol/L    Anion Gap 13 7 - 16 mmol/L    Glucose 303 (H) 74 - 99 mg/dL    BUN 23 8 - 23 mg/dL    CREATININE 1.2 0.7 - 1.2 mg/dL    GFR Non-African American 57 >=60 mL/min/1.73    GFR African American >60     Calcium 8.1 (L) 8.6 - 10.2 mg/dL    Total Protein 6.4 6.4 - 8.3 g/dL    Alb 3.2 (L) 3.5 - 5.2 g/dL    Total Bilirubin 0.6 0.0 - 1.2 mg/dL    Alkaline Phosphatase 89 40 - 129 U/L    ALT 19 0 - 40 U/L    AST 36 0 - 39 U/L   Blood Gas, Arterial   Result Value Ref Range    Date Analyzed 20200110     Time Analyzed 1248     Source: Blood Arterial     pH, Blood Gas 7.370 7.350 - 7.450    PCO2 38.9 35.0 - 45.0 mmHg    PO2 78.2 60.0 - 100.0 mmHg myself. BP (!) 147/71   Pulse 120   Temp 96.7 °F (35.9 °C)   Resp 20   Ht 6' (1.829 m)   Wt 221 lb (100.2 kg)   SpO2 96%   BMI 29.97 kg/m²   Oxygen Saturation Interpretation: Normal    The patients available past medical records and past encounters were reviewed. ------------------------------ ED COURSE/MEDICAL DECISION MAKING----------------------  Medications   cefTRIAXone (ROCEPHIN) 1 g in dextrose 5 % 50 mL IVPB (vial-mate) (1 g Intravenous New Bag 1/10/20 0377)   ipratropium-albuterol (DUONEB) nebulizer solution 1 ampule (1 ampule Inhalation Given 1/10/20 1259)   methylPREDNISolone sodium (SOLU-MEDROL) injection 125 mg (125 mg Intravenous Given 1/10/20 1334)   sodium chloride flush 0.9 % injection 10 mL (10 mLs Intravenous Given 1/10/20 1413)   iopamidol (ISOVUE-370) 76 % injection 75 mL (75 mLs Intravenous Given 1/10/20 1413)   doxycycline hyclate (VIBRAMYCIN) capsule 100 mg (100 mg Oral Given 1/10/20 1454)             Medical Decision Making:    Sepsis work-up initiated drip panel a CT of the chest demonstrated bilateral infiltrates no PE. Patient was given IV fluids and IV Solu-Medrol. He does a leukocytosis and elevated lactate. Antibiotics were started to cover  CAP. ABG was obtained upon arrival.  Patient sats improved with supplemental oxygen. He did not require BiPAP at this time. He was given breathing treatments. Consultation was made with the covering PCP for admission. Consultations pending with pulmonary. Re-Evaluations:             Re-evaluation. Patients symptoms are improving      Consultations:             Dr Melissa Mondragon will admit    Critical Care:   CRITICAL CARE:   35 MINUTES. Please note that the withdrawal or failure to initiate urgent interventions for this patient would likely result in a life threatening deterioration or permanent disability. Accordingly this patient received the above mentioned time, excluding separately billable procedures. This patient's ED course included: a personal history and physicial examination, re-evaluation prior to disposition, multiple bedside re-evaluations, IV medications, cardiac monitoring, continuous pulse oximetry, complex medical decision making and emergency management and a personal history and physicial eaxmination    This patient has remained hemodynamically stable, remained unchanged and been closely monitored during their ED course. Counseling: The emergency provider has spoken with the patient and spouse/SO and discussed todays results, in addition to providing specific details for the plan of care and counseling regarding the diagnosis and prognosis. Questions are answered at this time and they are agreeable with the plan.       --------------------------------- IMPRESSION AND DISPOSITION ---------------------------------    IMPRESSION  1. COPD exacerbation ([address]    2. Pneumonia due to organism    3. Dyspnea and respiratory abnormalities        DISPOSITION  Disposition: Admit to telemetry  Patient condition is stable        NOTE: This report was transcribed using voice recognition software.  Every effort was made to ensure accuracy; however, inadvertent computerized transcription errors may be present        Alm Bloch, DO  01/10/20 1512

## 2020-01-11 ENCOUNTER — APPOINTMENT (OUTPATIENT)
Dept: GENERAL RADIOLOGY | Age: 85
DRG: 193 | End: 2020-01-11
Payer: MEDICARE

## 2020-01-11 LAB
ANION GAP SERPL CALCULATED.3IONS-SCNC: 15 MMOL/L (ref 7–16)
BUN BLDV-MCNC: 26 MG/DL (ref 8–23)
CALCIUM SERPL-MCNC: 8.4 MG/DL (ref 8.6–10.2)
CHLORIDE BLD-SCNC: 103 MMOL/L (ref 98–107)
CO2: 22 MMOL/L (ref 22–29)
CREAT SERPL-MCNC: 1.3 MG/DL (ref 0.7–1.2)
GFR AFRICAN AMERICAN: >60
GFR NON-AFRICAN AMERICAN: 52 ML/MIN/1.73
GLUCOSE BLD-MCNC: 273 MG/DL (ref 74–99)
HCT VFR BLD CALC: 44.6 % (ref 37–54)
HEMOGLOBIN: 13.7 G/DL (ref 12.5–16.5)
L. PNEUMOPHILA SEROGP 1 UR AG: NORMAL
MCH RBC QN AUTO: 29.7 PG (ref 26–35)
MCHC RBC AUTO-ENTMCNC: 30.7 % (ref 32–34.5)
MCV RBC AUTO: 96.5 FL (ref 80–99.9)
METER GLUCOSE: 250 MG/DL (ref 74–99)
METER GLUCOSE: 309 MG/DL (ref 74–99)
METER GLUCOSE: 326 MG/DL (ref 74–99)
METER GLUCOSE: 425 MG/DL (ref 74–99)
PDW BLD-RTO: 13.2 FL (ref 11.5–15)
PLATELET # BLD: 241 E9/L (ref 130–450)
PMV BLD AUTO: 10.3 FL (ref 7–12)
POTASSIUM REFLEX MAGNESIUM: 4.6 MMOL/L (ref 3.5–5)
RBC # BLD: 4.62 E12/L (ref 3.8–5.8)
SODIUM BLD-SCNC: 140 MMOL/L (ref 132–146)
STREP PNEUMONIAE ANTIGEN, URINE: NORMAL
WBC # BLD: 22.4 E9/L (ref 4.5–11.5)

## 2020-01-11 PROCEDURE — 94640 AIRWAY INHALATION TREATMENT: CPT

## 2020-01-11 PROCEDURE — 2580000003 HC RX 258: Performed by: INTERNAL MEDICINE

## 2020-01-11 PROCEDURE — 85027 COMPLETE CBC AUTOMATED: CPT

## 2020-01-11 PROCEDURE — 6360000002 HC RX W HCPCS: Performed by: INTERNAL MEDICINE

## 2020-01-11 PROCEDURE — 2700000000 HC OXYGEN THERAPY PER DAY

## 2020-01-11 PROCEDURE — 36415 COLL VENOUS BLD VENIPUNCTURE: CPT

## 2020-01-11 PROCEDURE — 94761 N-INVAS EAR/PLS OXIMETRY MLT: CPT

## 2020-01-11 PROCEDURE — 80048 BASIC METABOLIC PNL TOTAL CA: CPT

## 2020-01-11 PROCEDURE — 2060000000 HC ICU INTERMEDIATE R&B

## 2020-01-11 PROCEDURE — 6370000000 HC RX 637 (ALT 250 FOR IP): Performed by: INTERNAL MEDICINE

## 2020-01-11 PROCEDURE — 94760 N-INVAS EAR/PLS OXIMETRY 1: CPT

## 2020-01-11 PROCEDURE — 71045 X-RAY EXAM CHEST 1 VIEW: CPT

## 2020-01-11 PROCEDURE — 82962 GLUCOSE BLOOD TEST: CPT

## 2020-01-11 PROCEDURE — 94660 CPAP INITIATION&MGMT: CPT

## 2020-01-11 RX ORDER — IPRATROPIUM BROMIDE AND ALBUTEROL SULFATE 2.5; .5 MG/3ML; MG/3ML
1 SOLUTION RESPIRATORY (INHALATION) EVERY 4 HOURS PRN
Status: DISCONTINUED | OUTPATIENT
Start: 2020-01-11 | End: 2020-01-17 | Stop reason: HOSPADM

## 2020-01-11 RX ORDER — FORMOTEROL FUMARATE 20 UG/2ML
20 SOLUTION RESPIRATORY (INHALATION) 2 TIMES DAILY
Status: DISCONTINUED | OUTPATIENT
Start: 2020-01-11 | End: 2020-01-17 | Stop reason: HOSPADM

## 2020-01-11 RX ORDER — ALBUTEROL SULFATE 2.5 MG/3ML
2.5 SOLUTION RESPIRATORY (INHALATION) EVERY 4 HOURS
Status: DISCONTINUED | OUTPATIENT
Start: 2020-01-11 | End: 2020-01-17 | Stop reason: HOSPADM

## 2020-01-11 RX ORDER — METHYLPREDNISOLONE SODIUM SUCCINATE 125 MG/2ML
60 INJECTION, POWDER, LYOPHILIZED, FOR SOLUTION INTRAMUSCULAR; INTRAVENOUS EVERY 8 HOURS
Status: DISCONTINUED | OUTPATIENT
Start: 2020-01-11 | End: 2020-01-13

## 2020-01-11 RX ORDER — METHYLPREDNISOLONE SODIUM SUCCINATE 125 MG/2ML
80 INJECTION, POWDER, LYOPHILIZED, FOR SOLUTION INTRAMUSCULAR; INTRAVENOUS DAILY
Status: DISCONTINUED | OUTPATIENT
Start: 2020-01-11 | End: 2020-01-11

## 2020-01-11 RX ORDER — INSULIN GLARGINE 100 [IU]/ML
20 INJECTION, SOLUTION SUBCUTANEOUS NIGHTLY
Status: DISCONTINUED | OUTPATIENT
Start: 2020-01-11 | End: 2020-01-13

## 2020-01-11 RX ORDER — BUDESONIDE 0.5 MG/2ML
0.5 INHALANT ORAL 2 TIMES DAILY
Status: DISCONTINUED | OUTPATIENT
Start: 2020-01-11 | End: 2020-01-17 | Stop reason: HOSPADM

## 2020-01-11 RX ORDER — ALBUTEROL SULFATE 2.5 MG/3ML
2.5 SOLUTION RESPIRATORY (INHALATION) EVERY 4 HOURS PRN
Status: DISCONTINUED | OUTPATIENT
Start: 2020-01-11 | End: 2020-01-11

## 2020-01-11 RX ADMIN — FORMOTEROL FUMARATE DIHYDRATE 20 MCG: 20 SOLUTION RESPIRATORY (INHALATION) at 20:02

## 2020-01-11 RX ADMIN — INSULIN LISPRO 6 UNITS: 100 INJECTION, SOLUTION INTRAVENOUS; SUBCUTANEOUS at 20:16

## 2020-01-11 RX ADMIN — INSULIN GLARGINE 20 UNITS: 100 INJECTION, SOLUTION SUBCUTANEOUS at 20:17

## 2020-01-11 RX ADMIN — BUDESONIDE 500 MCG: 0.5 SUSPENSION RESPIRATORY (INHALATION) at 11:29

## 2020-01-11 RX ADMIN — METHYLPREDNISOLONE SODIUM SUCCINATE 80 MG: 125 INJECTION, POWDER, FOR SOLUTION INTRAMUSCULAR; INTRAVENOUS at 11:07

## 2020-01-11 RX ADMIN — ENOXAPARIN SODIUM 40 MG: 40 INJECTION SUBCUTANEOUS at 08:38

## 2020-01-11 RX ADMIN — ACETAMINOPHEN 650 MG: 325 TABLET, FILM COATED ORAL at 06:41

## 2020-01-11 RX ADMIN — BUDESONIDE 500 MCG: 0.5 SUSPENSION RESPIRATORY (INHALATION) at 20:02

## 2020-01-11 RX ADMIN — ASPIRIN 81 MG 81 MG: 81 TABLET ORAL at 08:38

## 2020-01-11 RX ADMIN — FORMOTEROL FUMARATE DIHYDRATE 20 MCG: 20 SOLUTION RESPIRATORY (INHALATION) at 11:29

## 2020-01-11 RX ADMIN — DOXYCYCLINE HYCLATE 100 MG: 100 CAPSULE ORAL at 20:16

## 2020-01-11 RX ADMIN — INSULIN LISPRO 8 UNITS: 100 INJECTION, SOLUTION INTRAVENOUS; SUBCUTANEOUS at 16:34

## 2020-01-11 RX ADMIN — INSULIN LISPRO 6 UNITS: 100 INJECTION, SOLUTION INTRAVENOUS; SUBCUTANEOUS at 08:40

## 2020-01-11 RX ADMIN — SODIUM CHLORIDE, PRESERVATIVE FREE 10 ML: 5 INJECTION INTRAVENOUS at 20:16

## 2020-01-11 RX ADMIN — ALBUTEROL SULFATE 2.5 MG: 2.5 SOLUTION RESPIRATORY (INHALATION) at 20:02

## 2020-01-11 RX ADMIN — FINASTERIDE 5 MG: 5 TABLET, FILM COATED ORAL at 08:38

## 2020-01-11 RX ADMIN — ALBUTEROL SULFATE 2.5 MG: 2.5 SOLUTION RESPIRATORY (INHALATION) at 07:43

## 2020-01-11 RX ADMIN — ALBUTEROL SULFATE 2.5 MG: 2.5 SOLUTION RESPIRATORY (INHALATION) at 16:11

## 2020-01-11 RX ADMIN — ROSUVASTATIN CALCIUM 10 MG: 10 TABLET, FILM COATED ORAL at 20:16

## 2020-01-11 RX ADMIN — CEFTRIAXONE SODIUM 1 G: 1 INJECTION, POWDER, FOR SOLUTION INTRAMUSCULAR; INTRAVENOUS at 16:33

## 2020-01-11 RX ADMIN — ALBUTEROL SULFATE 2.5 MG: 2.5 SOLUTION RESPIRATORY (INHALATION) at 11:29

## 2020-01-11 RX ADMIN — ALBUTEROL SULFATE 2.5 MG: 2.5 SOLUTION RESPIRATORY (INHALATION) at 23:59

## 2020-01-11 RX ADMIN — DOXYCYCLINE HYCLATE 100 MG: 100 CAPSULE ORAL at 08:38

## 2020-01-11 RX ADMIN — METHYLPREDNISOLONE SODIUM SUCCINATE 60 MG: 125 INJECTION, POWDER, FOR SOLUTION INTRAMUSCULAR; INTRAVENOUS at 19:00

## 2020-01-11 RX ADMIN — INSULIN LISPRO 6 UNITS: 100 INJECTION, SOLUTION INTRAVENOUS; SUBCUTANEOUS at 12:00

## 2020-01-11 ASSESSMENT — PAIN SCALES - GENERAL
PAINLEVEL_OUTOF10: 0
PAINLEVEL_OUTOF10: 7

## 2020-01-11 NOTE — H&P
Take 650 mg by mouth every 6 hours as needed for Pain   Yes Historical Provider, MD   albuterol (PROVENTIL) (2.5 MG/3ML) 0.083% nebulizer solution Take 3 mLs by nebulization every 6 hours 10/1/19  Yes Donald Schumacher MD   GLIPIZIDE XL 5 MG extended release tablet Take 5 mg by mouth daily  7/29/18  Yes Historical Provider, MD   furosemide (LASIX) 20 MG tablet Take 20 mg by mouth three times a week Indications: Takes M, W, F Given Monday ,Wednesday ,Friday 6/8/18  Yes Historical Provider, MD   aspirin 81 MG tablet Take 81 mg by mouth daily   Yes Historical Provider, MD   rosuvastatin (CRESTOR) 10 MG tablet Take 10 mg by mouth daily   Yes Historical Provider, MD   dutasteride (AVODART) 0.5 MG capsule Take 0.5 mg by mouth Twice a Week Given Wednesday, Saturday   Yes Historical Provider, MD   Calcium Carbonate Antacid (OLGA-SELTZER ANTACID PO) Take 1 tablet by mouth as needed (heartburn)    Yes Historical Provider, MD        Social History     Socioeconomic History    Marital status: Single     Spouse name: None    Number of children: None    Years of education: None    Highest education level: None   Occupational History    Occupation: retired- commercial [employer]   Social Needs    Financial resource strain: None    Food insecurity:     Worry: None     Inability: None    Transportation needs:     Medical: None     Non-medical: None   Tobacco Use    Smoking status: Never Smoker    Smokeless tobacco: Never Used   Substance and Sexual Activity    Alcohol use: No     Alcohol/week: 0.0 standard drinks    Drug use: No    Sexual activity: Never   Lifestyle    Physical activity:     Days per week: None     Minutes per session: None    Stress: None   Relationships    Social connections:     Talks on phone: None     Gets together: None     Attends [employer]ism service: None     Active member of club or organization: None     Attends meetings of clubs or organizations: None     Relationship status: None    Intimate Hepatic Function Panel:    Lab Results   Component Value Date    ALKPHOS 89 01/10/2020    AST 36 01/10/2020    ALT 19 01/10/2020    PROT 6.4 01/10/2020    LABALBU 3.2 01/10/2020    BILITOT 0.6 01/10/2020     Magnesium:  No results found for: MG  Cardiac Enzymes:   Lab Results   Component Value Date    CKTOTAL 451 (H) 05/22/2017    CKTOTAL 749 (H) 05/20/2017    CKTOTAL 1,161 (H) 05/20/2017    CKMB 3.3 05/21/2017    CKMB 3.6 05/20/2017    CKMB 4.5 05/20/2017    TROPONINI <0.01 01/10/2020    TROPONINI 0.01 05/20/2017    TROPONINI 0.01 05/20/2017     LDH:  No results found for: LDH  PT/INR:    Lab Results   Component Value Date    PROTIME 12.6 01/10/2020    INR 1.1 01/10/2020     BNP: No results for input(s): BNP in the last 72 hours.    TSH:   Lab Results   Component Value Date    TSH 3.480 12/05/2019      Cardiac Injury Profile:   Recent Labs     01/10/20  1219   TROPONINI <0.01      Lipid Profile:   Lab Results   Component Value Date    TRIG 150 12/05/2019    HDL 58 12/05/2019    LDLCALC 62 12/05/2019    CHOL 150 12/05/2019      Hemoglobin A1C: No components found for: HGBA1C   U/A:   Lab Results   Component Value Date    NITRITE neg 11/20/2019    LEUKOCYTESUR neg 11/20/2019    LEUKOCYTESUR Negative 05/19/2017    PHUR 7.0 11/20/2019    PHUR 5.0 05/19/2017    WBCUA 1-3 05/19/2017    RBCUA NONE 05/19/2017    BACTERIA FEW 05/19/2017    SPECGRAV 1.020 11/20/2019    SPECGRAV 1.025 05/19/2017    BLOODU neg 11/20/2019    BLOODU SMALL 05/19/2017    GLUCOSEU 100 mg 11/20/2019    GLUCOSEU 100 05/19/2017         ADMISSION SCHEDULED MEDS:   Current Facility-Administered Medications   Medication Dose Route Frequency Provider Last Rate Last Dose    ipratropium-albuterol (DUONEB) nebulizer solution 1 ampule  1 ampule Inhalation Q4H PRN Tony Ball MD        formoterol (PERFOROMIST) nebulizer solution 20 mcg  20 mcg Nebulization BID Tony Ball MD        budesonide (PULMICORT) nebulizer suspension 500 mcg  0.5 mg Dr. Papo Velez)   [facility] superficial gastritis without hemorrhage       PLAN:  Based on hemoglobin A1c his diabetes is not well controlled  Continue the antibiotic therapy for pneumonia  Continue aerosol treatments  DVT prophylaxis  More aggressive glycemic control will be required due to the steroid  Pulmonary consultation has been placed and is pending      See  Orders  Gaurav Reynaga MD, Mile Minaya, [address] [place] [address] Rd., [address] of Internal Medicine  Diplomate, [address] of Internal Medicine  10:30 AM  1/11/2020

## 2020-01-11 NOTE — CONSULTS
Moises Martinez M.D.,[facility]  Brandyn Eric D.O., F.A.C.OSOLIS., Jewell Pineda M.D. Louvella Romberg, M.D., Sarahi Chin M.D. Patient:  Matty Sprague 80 y.o. male MRN: [id number]     Date of Service: 1/11/2020      PULMONARY CONSULTATION    Reason for Consultation: AECOPD   Pneumonia   Referring Physician: Dr. Rupal Herring: full code     SUBJECTIVE:  HPI:  Matty Sprague is a 80 y.o.  male known to my partner Dr. Claudia Lara who treats him for ILD and asthma. He presented to the ED with shortness of breath and wheezing associated with a dry cough. He recently saw my partner in the office at that time he complained of wheezing and coughing. Patient's history was obtained from the EMR As he is currently wearing bipap.  has had nonproductive cough subjective fever and chills. He is complaining of generalized weakness and shortness of breath. He is on no supplemental oxygen at home however he was hypoxic for EMS. He was placed on several liters nasal cannula upon arrival. He was short of breath on the floor requiring bipap to help improve his respiratory status  No nausea vomiting erika pain. He had a leukocytosis and is started on abx.  He has not been hospitalized recently or on any recent steroids or antibiotics.          Past Medical History:   Diagnosis Date    Arthritis 2015    rheumatoid    [address] [facility]ch[facility] obstructive [facility]) [facility]     Diabetes mellitus ([facility]     Hearing aid worn     bilateral    Hyperlipidemia        Past Surgical History:   Procedure Laterality Date    CHOLECYSTECTOMY      apx 20 years ago    COLONOSCOPY  [id number]    COLONOSCOPY  4/4/2016    ENDOSCOPY, COLON, DIAGNOSTIC      HERNIA REPAIR      unsure    UPPER GASTROINTESTINAL ENDOSCOPY  05/23/2017       Family History   Problem Relation Age of Onset    Heart Failure Mother     Cancer Father     Heart Failure Sister     Heart Disease Brother        Social History:   Social MG/3ML) 0.083% nebulizer solution, Take 3 mLs by nebulization every 6 hours  GLIPIZIDE XL 5 MG extended release tablet, Take 5 mg by mouth daily   furosemide (LASIX) 20 MG tablet, Take 20 mg by mouth three times a week Indications: Takes M, W, F Given Monday ,Wednesday ,Friday  aspirin 81 MG tablet, Take 81 mg by mouth daily  rosuvastatin (CRESTOR) 10 MG tablet, Take 10 mg by mouth daily  dutasteride (AVODART) 0.5 MG capsule, Take 0.5 mg by mouth Twice a Week Given Wednesday, Saturday  Calcium Carbonate Antacid (OLGA-SELTZER ANTACID PO), Take 1 tablet by mouth as needed (heartburn)     CURRENT MEDS :  Scheduled Meds:   formoterol  20 mcg Nebulization BID    budesonide  0.5 mg Nebulization BID    albuterol  2.5 mg Nebulization Q4H    insulin glargine  20 Units Subcutaneous Nightly    methylPREDNISolone  80 mg Intravenous Daily    aspirin  81 mg Oral Daily    finasteride  5 mg Oral Daily    [START ON 1/13/2020] furosemide  20 mg Oral Once per day on Mon Wed Fri    rosuvastatin  10 mg Oral Daily    sodium chloride flush  10 mL Intravenous 2 times per day    enoxaparin  40 mg Subcutaneous Daily    cefTRIAXone (ROCEPHIN) IV  1 g Intravenous Q24H    doxycycline hyclate  100 mg Oral 2 times per day    insulin lispro  0-12 Units Subcutaneous TID WC    insulin lispro  0-6 Units Subcutaneous Nightly       Continuous Infusions:   dextrose      sodium chloride 75 mL/hr at 01/11/20 0618       No Known Allergies    REVIEW OF SYSTEMS:  Constitutional: Denies fever, weight loss, night sweats, and fatigue  Skin: Denies pigmentation, dark lesions, and rashes   HEENT: Denies hearing loss, tinnitus, ear drainage, epistaxis, sore throat, and hoarseness. Cardiovascular: Denies palpitations, chest pain, and chest pressure. Respiratory: Denies cough, dyspnea at rest, hemoptysis, apnea, and choking.   Gastrointestinal: Denies nausea, vomiting, poor appetite, diarrhea, heartburn or reflux  Genitourinary: Denies dysuria,

## 2020-01-12 ENCOUNTER — APPOINTMENT (OUTPATIENT)
Dept: GENERAL RADIOLOGY | Age: 85
DRG: 193 | End: 2020-01-12
Payer: MEDICARE

## 2020-01-12 LAB
METER GLUCOSE: 285 MG/DL (ref 74–99)
METER GLUCOSE: 367 MG/DL (ref 74–99)
METER GLUCOSE: 373 MG/DL (ref 74–99)
METER GLUCOSE: 413 MG/DL (ref 74–99)
METER GLUCOSE: 455 MG/DL (ref 74–99)

## 2020-01-12 PROCEDURE — 6370000000 HC RX 637 (ALT 250 FOR IP): Performed by: INTERNAL MEDICINE

## 2020-01-12 PROCEDURE — 6360000002 HC RX W HCPCS: Performed by: INTERNAL MEDICINE

## 2020-01-12 PROCEDURE — 2700000000 HC OXYGEN THERAPY PER DAY

## 2020-01-12 PROCEDURE — 2580000003 HC RX 258: Performed by: INTERNAL MEDICINE

## 2020-01-12 PROCEDURE — 82962 GLUCOSE BLOOD TEST: CPT

## 2020-01-12 PROCEDURE — 71045 X-RAY EXAM CHEST 1 VIEW: CPT

## 2020-01-12 PROCEDURE — 2060000000 HC ICU INTERMEDIATE R&B

## 2020-01-12 PROCEDURE — 94640 AIRWAY INHALATION TREATMENT: CPT

## 2020-01-12 PROCEDURE — 94660 CPAP INITIATION&MGMT: CPT

## 2020-01-12 RX ADMIN — INSULIN LISPRO 5 UNITS: 100 INJECTION, SOLUTION INTRAVENOUS; SUBCUTANEOUS at 21:33

## 2020-01-12 RX ADMIN — INSULIN GLARGINE 20 UNITS: 100 INJECTION, SOLUTION SUBCUTANEOUS at 21:33

## 2020-01-12 RX ADMIN — CEFTRIAXONE SODIUM 1 G: 1 INJECTION, POWDER, FOR SOLUTION INTRAMUSCULAR; INTRAVENOUS at 15:40

## 2020-01-12 RX ADMIN — ENOXAPARIN SODIUM 40 MG: 40 INJECTION SUBCUTANEOUS at 09:53

## 2020-01-12 RX ADMIN — DOXYCYCLINE HYCLATE 100 MG: 100 CAPSULE ORAL at 21:32

## 2020-01-12 RX ADMIN — METHYLPREDNISOLONE SODIUM SUCCINATE 60 MG: 125 INJECTION, POWDER, FOR SOLUTION INTRAMUSCULAR; INTRAVENOUS at 19:09

## 2020-01-12 RX ADMIN — ALBUTEROL SULFATE 2.5 MG: 2.5 SOLUTION RESPIRATORY (INHALATION) at 08:16

## 2020-01-12 RX ADMIN — ACETAMINOPHEN 650 MG: 325 TABLET, FILM COATED ORAL at 09:52

## 2020-01-12 RX ADMIN — INSULIN LISPRO 6 UNITS: 100 INJECTION, SOLUTION INTRAVENOUS; SUBCUTANEOUS at 08:57

## 2020-01-12 RX ADMIN — BUDESONIDE 500 MCG: 0.5 SUSPENSION RESPIRATORY (INHALATION) at 08:16

## 2020-01-12 RX ADMIN — BUDESONIDE 500 MCG: 0.5 SUSPENSION RESPIRATORY (INHALATION) at 21:10

## 2020-01-12 RX ADMIN — FINASTERIDE 5 MG: 5 TABLET, FILM COATED ORAL at 09:52

## 2020-01-12 RX ADMIN — SODIUM CHLORIDE, PRESERVATIVE FREE 10 ML: 5 INJECTION INTRAVENOUS at 21:33

## 2020-01-12 RX ADMIN — METHYLPREDNISOLONE SODIUM SUCCINATE 60 MG: 125 INJECTION, POWDER, FOR SOLUTION INTRAMUSCULAR; INTRAVENOUS at 11:18

## 2020-01-12 RX ADMIN — METHYLPREDNISOLONE SODIUM SUCCINATE 60 MG: 125 INJECTION, POWDER, FOR SOLUTION INTRAMUSCULAR; INTRAVENOUS at 02:59

## 2020-01-12 RX ADMIN — ASPIRIN 81 MG 81 MG: 81 TABLET ORAL at 09:52

## 2020-01-12 RX ADMIN — ALBUTEROL SULFATE 2.5 MG: 2.5 SOLUTION RESPIRATORY (INHALATION) at 21:10

## 2020-01-12 RX ADMIN — SODIUM CHLORIDE, PRESERVATIVE FREE 10 ML: 5 INJECTION INTRAVENOUS at 09:56

## 2020-01-12 RX ADMIN — ALBUTEROL SULFATE 2.5 MG: 2.5 SOLUTION RESPIRATORY (INHALATION) at 16:21

## 2020-01-12 RX ADMIN — FORMOTEROL FUMARATE DIHYDRATE 20 MCG: 20 SOLUTION RESPIRATORY (INHALATION) at 21:10

## 2020-01-12 RX ADMIN — DOXYCYCLINE HYCLATE 100 MG: 100 CAPSULE ORAL at 09:52

## 2020-01-12 RX ADMIN — IPRATROPIUM BROMIDE AND ALBUTEROL SULFATE 1 AMPULE: 2.5; .5 SOLUTION RESPIRATORY (INHALATION) at 09:26

## 2020-01-12 RX ADMIN — ROSUVASTATIN CALCIUM 10 MG: 10 TABLET, FILM COATED ORAL at 09:52

## 2020-01-12 RX ADMIN — ALBUTEROL SULFATE 2.5 MG: 2.5 SOLUTION RESPIRATORY (INHALATION) at 12:54

## 2020-01-12 RX ADMIN — INSULIN LISPRO 10 UNITS: 100 INJECTION, SOLUTION INTRAVENOUS; SUBCUTANEOUS at 17:50

## 2020-01-12 RX ADMIN — FORMOTEROL FUMARATE DIHYDRATE 20 MCG: 20 SOLUTION RESPIRATORY (INHALATION) at 08:17

## 2020-01-12 RX ADMIN — ACETAMINOPHEN 650 MG: 325 TABLET, FILM COATED ORAL at 21:32

## 2020-01-12 RX ADMIN — ALBUTEROL SULFATE 2.5 MG: 2.5 SOLUTION RESPIRATORY (INHALATION) at 03:35

## 2020-01-12 RX ADMIN — INSULIN LISPRO 12 UNITS: 100 INJECTION, SOLUTION INTRAVENOUS; SUBCUTANEOUS at 11:18

## 2020-01-12 ASSESSMENT — PAIN SCALES - GENERAL
PAINLEVEL_OUTOF10: 3
PAINLEVEL_OUTOF10: 0
PAINLEVEL_OUTOF10: 4
PAINLEVEL_OUTOF10: 3

## 2020-01-12 ASSESSMENT — PAIN DESCRIPTION - ONSET: ONSET: ON-GOING

## 2020-01-12 ASSESSMENT — PAIN DESCRIPTION - DESCRIPTORS: DESCRIPTORS: ACHING;DISCOMFORT;SORE

## 2020-01-12 ASSESSMENT — PAIN DESCRIPTION - FREQUENCY: FREQUENCY: INTERMITTENT

## 2020-01-12 ASSESSMENT — PAIN DESCRIPTION - LOCATION: LOCATION: GENERALIZED

## 2020-01-12 ASSESSMENT — PAIN DESCRIPTION - PROGRESSION: CLINICAL_PROGRESSION: NOT CHANGED

## 2020-01-12 NOTE — PROGRESS NOTES
Pavan Serra MD, FACP                   Patient Name: Jeanne Arias                   Age:  80 y.o. Gender:   male    CC: Shortness of breath and cough    HPI: This 20-year-old male patient presented to the emergency room with a 1 day history of increasing shortness of breath and coughing.   He has a history of COPD and follows with pulmonary medicine  He denied any other systemic symptoms no fever or chills    Evaluation in the hospital emergency room demonstrated multifocal infiltrates which were changed from his previous CT scan in 2015  It also demonstrated a nodule--  He was then started on treatment for immunity acquired pneumonia  He had a leukocytosis as well-has elevated procalcitonin    This morning he is wearing BiPAP  Is still short of breath  Oxygen saturation ranges from 96 to 99% on 3 L  There is no fever    Morning the white cell count has dropped from 27-22    1/12  Patient is still having a considerable amount of difficulty breathing  He has been evaluated by pulmonary medicine  Steroids are to continue cultures have been requested bronchodilators are continued noninvasive positive pressure ventilation continues chest x-ray for today  The patient is awake and alert    Past Medical History:   Diagnosis Date    Arthritis 2015    rheumatoid    COPD (chronic obstructive pulmonary disease) ([address]     Diabetes mellitus ([address]     Hearing aid worn     bilateral    Hyperlipidemia        Past Surgical History:   Procedure Laterality Date    CHOLECYSTECTOMY      apx 20 years ago    COLONOSCOPY  [id number]    COLONOSCOPY  4/4/2016    ENDOSCOPY, COLON, DIAGNOSTIC      HERNIA REPAIR      unsure    UPPER GASTROINTESTINAL ENDOSCOPY  05/23/2017       No Known Allergies    The patient's medical records have been reviewed contingent on availability        Review of Systems:   · General: Denies malaise or weakness. Denies fever or chills. · Cardiovascular: No chest pain, palpitations, syncope. · Respiratory: No hemoptysis, pleuritic pain. Continues with shortness of breath        Physical Examination:      Wt Readings from Last 3 Encounters:   01/11/20 212 lb 14.4 oz (96.6 kg)   11/20/19 221 lb (100.2 kg)   10/01/19 219 lb (99.3 kg)     Temp Readings from Last 3 Encounters:   01/12/20 98.1 °F (36.7 °C) (Temporal)   11/20/19 97.9 °F (36.6 °C)   10/01/19 97.5 °F (36.4 °C) (Temporal)     BP Readings from Last 3 Encounters:   01/12/20 136/66   11/20/19 128/80   10/01/19 134/76     Pulse Readings from Last 3 Encounters:   01/12/20 103   11/20/19 70   10/01/19 87       General appearance: Normal, awake, alert with dyspnea even on an NIPPV  Eyes: Conjunctivae/cornea clear. Anderson Coffer. Sclera non icteric. Ears: External appearance normal.  Hearing grossly impaired  Mouth: Lips and tongue appear normal. Dentition noted  Neck:  Symmetric. No adenopathy. Thyroid symmetric, normal size, without nodules. Trachea is midline. Lungs: He has harsh breath sounds throughout both lung fields and he has wheezing bilaterally right greater than left  Heart: S1 > S2. Rhythm is regular and rate is normal.  Abdomen: Soft, mildly protuberant, non-tender. BS normal. No masses, organomegaly. Anatomic contours appear normal.  Extremities: No deformities, edema, or skin discoloration. Musculoskeletal: No unusual pain or swelling. Muscular strength intact.    Neuro:   · Normal exam  Mental status: Awake, alert, cognizant of person, place    Patient appears capable of directing self care   Mood: Normal and appropriate affect  Gait & balance: not assessed:     Labs     CBC:   Lab Results   Component Value Date    WBC 22.4 01/11/2020    RBC 4.62 01/11/2020    HGB 13.7 01/11/2020    HCT 44.6 01/11/2020     01/11/2020    MCV 96.5 01/11/2020     BMP:    Lab Results   Component Value Date     MD   1 ampule at 01/12/20 0926    formoterol (PERFOROMIST) nebulizer solution 20 mcg  20 mcg Nebulization BID Bonifacio Espinoza MD   20 mcg at 01/12/20 0817    budesonide (PULMICORT) nebulizer suspension 500 mcg  0.5 mg Nebulization BID Bonifacio Espinoza MD   500 mcg at 01/12/20 0816    albuterol (PROVENTIL) nebulizer solution 2.5 mg  2.5 mg Nebulization Q4H Bonifacio Espinoza MD   2.5 mg at 01/12/20 0816    insulin glargine (LANTUS) injection vial 20 Units  20 Units Subcutaneous Nightly Jeff Berry MD   20 Units at 01/11/20 2017    methylPREDNISolone sodium (SOLU-MEDROL) injection 60 mg  60 mg Intravenous Q8H Bonifacio Espinoza MD   60 mg at 01/12/20 0259    acetaminophen (TYLENOL) tablet 650 mg  650 mg Oral Q6H PRN Jeff Berry MD   650 mg at 01/12/20 6795    aspirin chewable tablet 81 mg  81 mg Oral Daily Jeff Berry MD   81 mg at 01/12/20 7720    finasteride (PROSCAR) tablet 5 mg  5 mg Oral Daily Jeff Berry MD   5 mg at 01/12/20 1011    [START ON 1/13/2020] furosemide (LASIX) tablet 20 mg  20 mg Oral Once per day on Mon Wed Fri Jeff Berry MD        rosuvastatin (CRESTOR) tablet 10 mg  10 mg Oral Daily Jeff Berry MD   10 mg at 01/12/20 8201    sodium chloride flush 0.9 % injection 10 mL  10 mL Intravenous 2 times per day Jeff Berry MD   10 mL at 01/12/20 0956    sodium chloride flush 0.9 % injection 10 mL  10 mL Intravenous PRN Jeff Berry MD        magnesium hydroxide (MILK OF MAGNESIA) 400 MG/5ML suspension 30 mL  30 mL Oral Daily PRN Jeff Berry MD        ondansetron [facility]) injection 4 mg  4 mg Intravenous Q6H PRN Jeff Berry MD        enoxaparin (LOVENOX) injection 40 mg  40 mg Subcutaneous Daily Jeff Berry MD   40 mg at 01/12/20 0953    cefTRIAXone (ROCEPHIN) 1 g in sterile water 10 mL IV syringe  1 g Intravenous Q24H Jeff Berry MD   1 g at 01/11/20 6383    doxycycline hyclate (VIBRAMYCIN) capsule 100 mg  100 mg Oral 2 times per day Markie Zuniga MD   100 mg at 01/12/20 0952    glucose (GLUTOSE) 40 % oral gel 15 g  15 g Oral PRN Markie Zuniga MD        dextrose 50 % IV solution  12.5 g Intravenous PRN Markie Zuniga MD        glucagon (rDNA) injection 1 mg  1 mg Intramuscular PRN Markie Zuniga MD        dextrose 5 % solution  100 mL/hr Intravenous PRN Markie Zuniga MD        insulin lispro (HUMALOG) injection vial 0-12 Units  0-12 Units Subcutaneous TID WC Markie Zuniga MD   6 Units at 01/12/20 0857    insulin lispro (HUMALOG) injection vial 0-6 Units  0-6 Units Subcutaneous Nightly Markie Zuniga MD   6 Units at 01/11/20 2016    0.9 % sodium chloride infusion   Intravenous Continuous Markie Zuniga MD 75 mL/hr at 01/11/20 2031         Current  Infusions   dextrose      sodium chloride 75 mL/hr at 01/11/20 0618       Prn Meds  ipratropium-albuterol, acetaminophen, sodium chloride flush, magnesium hydroxide, ondansetron, glucose, dextrose, glucagon (rDNA), dextrose    Radiology Review:  XR CHEST PORTABLE   Final Result   tortuous ectatic aorta   Airspace disease compatible with scar, chronic infiltrate could give   this appearance, unchanged from previous                     XR CHEST PORTABLE   Final Result   No interval change               CTA PULMONARY W CONTRAST   Final Result      1. No pulmonary embolism. 2. Small consolidative opacities in the right lower lobe and left   lower lobe, larger in the latter, which have developed in the interim   since the prior study from 5/23/2015 and are likely   infectious/inflammatory. 3. New 6 mm nodular density in the left upper lobe abutting the   fissure. Follow-up CT of the chest recommended in 6-12 months. 4. Stable prominent scarring along the lung bases. XR CHEST PORTABLE   Final Result   Progressive interstitial density in both lower lungs suggest a   spectrum of fibrosis or scarring without consolidation, effusion, or   CHF.

## 2020-01-12 NOTE — PROGRESS NOTES
The patient is lying in bed comfortably without any distress.   Breathing is not labored  HEENT: Pupils are equal round and reactive to light, there are no oral lesions and no post-nasal drip   Neck: supple without adenopathy  Cardiovascular: regular rate and rhythm without murmur or gallop  Respiratory: Diminished breath sounds bilaterally Air entry is symmetric  Abdomen: soft, non-tender, non-distended, normal bowel sounds  Extremities: warm, no edema, no clubbing  Skin: no rash or lesion  Neurologic: CN II-XII grossly intact, no focal deficits    Medications:    Scheduled Meds:   formoterol  20 mcg Nebulization BID    budesonide  0.5 mg Nebulization BID    albuterol  2.5 mg Nebulization Q4H    insulin glargine  20 Units Subcutaneous Nightly    methylPREDNISolone  60 mg Intravenous Q8H    aspirin  81 mg Oral Daily    finasteride  5 mg Oral Daily    [START ON 1/13/2020] furosemide  20 mg Oral Once per day on Mon Wed Fri    rosuvastatin  10 mg Oral Daily    sodium chloride flush  10 mL Intravenous 2 times per day    enoxaparin  40 mg Subcutaneous Daily    cefTRIAXone (ROCEPHIN) IV  1 g Intravenous Q24H    doxycycline hyclate  100 mg Oral 2 times per day    insulin lispro  0-12 Units Subcutaneous TID WC    insulin lispro  0-6 Units Subcutaneous Nightly       Continuous Infusions:   dextrose      sodium chloride 75 mL/hr at 01/11/20 0618       PRN Meds:  ipratropium-albuterol, acetaminophen, sodium chloride flush, magnesium hydroxide, ondansetron, glucose, dextrose, glucagon (rDNA), dextrose    Labs:  CBC:   Recent Labs     01/10/20  1219 01/11/20  0441   WBC 26.9* 22.4*   HGB 15.6 13.7   HCT 49.4 44.6   MCV 95.6 96.5    241     BMP:   Recent Labs     01/10/20  1219 01/11/20  0441    140   K 4.8 4.6    103   CO2 21* 22   BUN 23 26*   CREATININE 1.2 1.3*     LIVER PROFILE:   Recent Labs     01/10/20  1219   AST 36   ALT 19   BILITOT 0.6   ALKPHOS 89     PT/INR:   Recent Labs

## 2020-01-13 ENCOUNTER — APPOINTMENT (OUTPATIENT)
Dept: GENERAL RADIOLOGY | Age: 85
DRG: 193 | End: 2020-01-13
Payer: MEDICARE

## 2020-01-13 LAB
METER GLUCOSE: 248 MG/DL (ref 74–99)
METER GLUCOSE: 271 MG/DL (ref 74–99)
METER GLUCOSE: 294 MG/DL (ref 74–99)
METER GLUCOSE: 377 MG/DL (ref 74–99)

## 2020-01-13 PROCEDURE — 6360000002 HC RX W HCPCS: Performed by: INTERNAL MEDICINE

## 2020-01-13 PROCEDURE — 2700000000 HC OXYGEN THERAPY PER DAY

## 2020-01-13 PROCEDURE — 94640 AIRWAY INHALATION TREATMENT: CPT

## 2020-01-13 PROCEDURE — 2580000003 HC RX 258: Performed by: INTERNAL MEDICINE

## 2020-01-13 PROCEDURE — 6370000000 HC RX 637 (ALT 250 FOR IP): Performed by: INTERNAL MEDICINE

## 2020-01-13 PROCEDURE — 2060000000 HC ICU INTERMEDIATE R&B

## 2020-01-13 PROCEDURE — 94660 CPAP INITIATION&MGMT: CPT

## 2020-01-13 PROCEDURE — 71045 X-RAY EXAM CHEST 1 VIEW: CPT

## 2020-01-13 PROCEDURE — 82962 GLUCOSE BLOOD TEST: CPT

## 2020-01-13 RX ORDER — METHYLPREDNISOLONE SODIUM SUCCINATE 40 MG/ML
40 INJECTION, POWDER, LYOPHILIZED, FOR SOLUTION INTRAMUSCULAR; INTRAVENOUS EVERY 12 HOURS
Status: DISCONTINUED | OUTPATIENT
Start: 2020-01-14 | End: 2020-01-17

## 2020-01-13 RX ORDER — INSULIN GLARGINE 100 [IU]/ML
25 INJECTION, SOLUTION SUBCUTANEOUS NIGHTLY
Status: DISCONTINUED | OUTPATIENT
Start: 2020-01-13 | End: 2020-01-17 | Stop reason: HOSPADM

## 2020-01-13 RX ADMIN — ENOXAPARIN SODIUM 40 MG: 40 INJECTION SUBCUTANEOUS at 09:04

## 2020-01-13 RX ADMIN — INSULIN LISPRO 10 UNITS: 100 INJECTION, SOLUTION INTRAVENOUS; SUBCUTANEOUS at 12:05

## 2020-01-13 RX ADMIN — ALBUTEROL SULFATE 2.5 MG: 2.5 SOLUTION RESPIRATORY (INHALATION) at 14:15

## 2020-01-13 RX ADMIN — ALBUTEROL SULFATE 2.5 MG: 2.5 SOLUTION RESPIRATORY (INHALATION) at 21:22

## 2020-01-13 RX ADMIN — METHYLPREDNISOLONE SODIUM SUCCINATE 60 MG: 125 INJECTION, POWDER, FOR SOLUTION INTRAMUSCULAR; INTRAVENOUS at 12:04

## 2020-01-13 RX ADMIN — FORMOTEROL FUMARATE DIHYDRATE 20 MCG: 20 SOLUTION RESPIRATORY (INHALATION) at 09:57

## 2020-01-13 RX ADMIN — ALBUTEROL SULFATE 2.5 MG: 2.5 SOLUTION RESPIRATORY (INHALATION) at 01:00

## 2020-01-13 RX ADMIN — INSULIN LISPRO 4 UNITS: 100 INJECTION, SOLUTION INTRAVENOUS; SUBCUTANEOUS at 08:07

## 2020-01-13 RX ADMIN — DOXYCYCLINE HYCLATE 100 MG: 100 CAPSULE ORAL at 21:08

## 2020-01-13 RX ADMIN — INSULIN GLARGINE 25 UNITS: 100 INJECTION, SOLUTION SUBCUTANEOUS at 21:08

## 2020-01-13 RX ADMIN — CEFTRIAXONE SODIUM 1 G: 1 INJECTION, POWDER, FOR SOLUTION INTRAMUSCULAR; INTRAVENOUS at 15:30

## 2020-01-13 RX ADMIN — FUROSEMIDE 20 MG: 20 TABLET ORAL at 09:04

## 2020-01-13 RX ADMIN — DOXYCYCLINE HYCLATE 100 MG: 100 CAPSULE ORAL at 09:04

## 2020-01-13 RX ADMIN — FORMOTEROL FUMARATE DIHYDRATE 20 MCG: 20 SOLUTION RESPIRATORY (INHALATION) at 21:21

## 2020-01-13 RX ADMIN — FINASTERIDE 5 MG: 5 TABLET, FILM COATED ORAL at 09:04

## 2020-01-13 RX ADMIN — ROSUVASTATIN CALCIUM 10 MG: 10 TABLET, FILM COATED ORAL at 09:03

## 2020-01-13 RX ADMIN — ALBUTEROL SULFATE 2.5 MG: 2.5 SOLUTION RESPIRATORY (INHALATION) at 09:57

## 2020-01-13 RX ADMIN — INSULIN LISPRO 6 UNITS: 100 INJECTION, SOLUTION INTRAVENOUS; SUBCUTANEOUS at 17:33

## 2020-01-13 RX ADMIN — SODIUM CHLORIDE, PRESERVATIVE FREE 10 ML: 5 INJECTION INTRAVENOUS at 21:08

## 2020-01-13 RX ADMIN — SODIUM CHLORIDE, PRESERVATIVE FREE 10 ML: 5 INJECTION INTRAVENOUS at 15:30

## 2020-01-13 RX ADMIN — METHYLPREDNISOLONE SODIUM SUCCINATE 60 MG: 125 INJECTION, POWDER, FOR SOLUTION INTRAMUSCULAR; INTRAVENOUS at 03:14

## 2020-01-13 RX ADMIN — INSULIN LISPRO 3 UNITS: 100 INJECTION, SOLUTION INTRAVENOUS; SUBCUTANEOUS at 21:07

## 2020-01-13 RX ADMIN — BUDESONIDE 500 MCG: 0.5 SUSPENSION RESPIRATORY (INHALATION) at 09:57

## 2020-01-13 RX ADMIN — BUDESONIDE 500 MCG: 0.5 SUSPENSION RESPIRATORY (INHALATION) at 21:21

## 2020-01-13 RX ADMIN — ASPIRIN 81 MG 81 MG: 81 TABLET ORAL at 09:03

## 2020-01-13 ASSESSMENT — PAIN SCALES - GENERAL
PAINLEVEL_OUTOF10: 0
PAINLEVEL_OUTOF10: 0

## 2020-01-13 NOTE — PROGRESS NOTES
[facility]                  [address] · [address] [address] · [place]    [phone]   · Fax  [phone] ·    Arabella Julio M.D., Gabriela Leo D.O., F.A.C.O.I., [place] M.D.,   Alicia Hoff M.D., Lakeisha Rayo M.D. Moustapha Vázquez D.O. Pulmonary Progress Note    CC:  Pneumonia     Subjective:  No acute events overnight   Currently laying in bed states his breathing has improved but he has some chest tightness. Denies any fever, chills n/v + cough, not expectorating  Did use bipap overnight    Currently on oxygen  3 lpm via nasal cannula           Intake/Output Summary (Last 24 hours) at 1/13/2020 1751  Last data filed at 1/13/2020 1414  Gross per 24 hour   Intake 480 ml   Output 400 ml   Net 80 ml       Diet/Nutrition   DIET CARB CONTROL;    Vitals:   /61   Pulse 71   Temp 96.9 °F (36.1 °C) (Temporal)   Resp 18   Ht 6' (1.829 m)   Wt 212 lb 14.4 oz (96.6 kg)   SpO2 99%   BMI 28.87 kg/m²  on     I/O   I/O (24 Hours)    Patient Vitals for the past 8 hrs:   BP Temp Temp src Pulse Resp   01/13/20 1525 126/61 96.9 °F (36.1 °C) Temporal 71 18       Intake/Output Summary (Last 24 hours) at 1/13/2020 1751  Last data filed at 1/13/2020 1414  Gross per 24 hour   Intake 480 ml   Output 400 ml   Net 80 ml     I/O last 3 completed shifts: In: 480 [P.O.:480]  Out: 400 [Urine:400]   Date 01/13/20 0000 - 01/13/20 2359   Shift 3182-8184 4237-7666 0129-4152 24 Hour Total   INTAKE   P.O.  480  480   Shift Total(mL/kg)  480(5)  480(5)   OUTPUT   Urine(mL/kg/hr) 200(0.3) 200(0.3)  400   Shift Total(mL/kg) 200(2.1) 200(2.1)  400(4.1)   Weight (kg) 96.6 96.6 96.6 96.6     Patient Vitals for the past 96 hrs (Last 3 readings):   Weight   01/11/20 0618 212 lb 14.4 oz (96.6 kg)   01/10/20 1229 221 lb (100.2 kg)         PHYSICAL EXAMINATION:  General: The patient is lying in bed comfortably without any distress.   Breathing is not labored  HEENT: Pupils are equal round and reactive to light, there are no oral lesions and no post-nasal drip   Neck: supple without adenopathy  Cardiovascular: regular rate and rhythm without murmur or gallop  Respiratory: Diminished breath sounds bilaterally Air entry is symmetric  Abdomen: soft, non-tender, non-distended, normal bowel sounds  Extremities: warm, no edema, no clubbing  Skin: no rash or lesion  Neurologic: CN II-XII grossly intact, no focal deficits    Medications:    Scheduled Meds:   insulin glargine  25 Units Subcutaneous Nightly    formoterol  20 mcg Nebulization BID    budesonide  0.5 mg Nebulization BID    albuterol  2.5 mg Nebulization Q4H    methylPREDNISolone  60 mg Intravenous Q8H    aspirin  81 mg Oral Daily    finasteride  5 mg Oral Daily    furosemide  20 mg Oral Once per day on Mon Wed Fri    rosuvastatin  10 mg Oral Daily    sodium chloride flush  10 mL Intravenous 2 times per day    enoxaparin  40 mg Subcutaneous Daily    cefTRIAXone (ROCEPHIN) IV  1 g Intravenous Q24H    doxycycline hyclate  100 mg Oral 2 times per day    insulin lispro  0-12 Units Subcutaneous TID WC    insulin lispro  0-6 Units Subcutaneous Nightly       Continuous Infusions:   dextrose      sodium chloride 75 mL/hr at 01/11/20 0618       PRN Meds:  ipratropium-albuterol, acetaminophen, sodium chloride flush, magnesium hydroxide, ondansetron, glucose, dextrose, glucagon (rDNA), dextrose    Labs:  CBC:   Recent Labs     01/11/20  0441   WBC 22.4*   HGB 13.7   HCT 44.6   MCV 96.5        BMP:   Recent Labs     01/11/20  0441      K 4.6      CO2 22   BUN 26*   CREATININE 1.3*     LIVER PROFILE:   No results for input(s): AST, ALT, LIPASE, BILIDIR, BILITOT, ALKPHOS in the last 72 hours. Invalid input(s): AMYLASE,  ALB  PT/INR:   No results for input(s): PROTIME, INR in the last 72 hours. APTT:   No results for input(s): APTT in the last 72 hours.   UA:No results for

## 2020-01-13 NOTE — PROGRESS NOTES
Johnny Corey MD, FACP                   Patient Name: Tolu Bo                   Age:  80 y.o. Gender:   male    CC: Shortness of breath and cough    HPI: This 71-year-old male patient presented to the emergency room with a 1 day history of increasing shortness of breath and coughing.   He has a history of COPD and follows with pulmonary medicine  He denied any other systemic symptoms no fever or chills    Evaluation in the hospital emergency room demonstrated multifocal infiltrates which were changed from his previous CT scan in 2015  It also demonstrated a nodule--  He was then started on treatment for immunity acquired pneumonia  He had a leukocytosis as well-has elevated procalcitonin    This morning he is wearing BiPAP  Is still short of breath  Oxygen saturation ranges from 96 to 99% on 3 L  There is no fever    Morning the white cell count has dropped from 27-22    1/12  Patient is still having a considerable amount of difficulty breathing  He has been evaluated by pulmonary medicine  Steroids are to continue cultures have been requested bronchodilators are continued noninvasive positive pressure ventilation continues chest x-ray for today  The patient is awake and alert    1/13  He sounds considerably improved this morning  Is wearing his CPAP  Not have audible wheezes  More awake and alert  Viewed pulmonary note-continue the same treatment plan  Glycemic control is a bit better    Past Medical History:   Diagnosis Date    Arthritis 2015    rheumatoid    COPD (chronic obstructive pulmonary disease) ([address]     Diabetes mellitus ([address]     Hearing aid worn     bilateral    Hyperlipidemia        Past Surgical History:   Procedure Laterality Date    CHOLECYSTECTOMY      apx 20 years ago    COLONOSCOPY  [id number]    COLONOSCOPY  4/4/2016    ENDOSCOPY, COLON, Component Value Date     01/11/2020    K 4.6 01/11/2020     01/11/2020    CO2 22 01/11/2020    BUN 26 01/11/2020    CREATININE 1.3 01/11/2020    GLUCOSE 273 01/11/2020    CALCIUM 8.4 01/11/2020     Hepatic Function Panel:    Lab Results   Component Value Date    ALKPHOS 89 01/10/2020    AST 36 01/10/2020    ALT 19 01/10/2020    PROT 6.4 01/10/2020    LABALBU 3.2 01/10/2020    BILITOT 0.6 01/10/2020     Magnesium:  No results found for: MG  Cardiac Enzymes:   Lab Results   Component Value Date    CKTOTAL 451 (H) 05/22/2017    CKTOTAL 749 (H) 05/20/2017    CKTOTAL 1,161 (H) 05/20/2017    CKMB 3.3 05/21/2017    CKMB 3.6 05/20/2017    CKMB 4.5 05/20/2017    TROPONINI <0.01 01/10/2020    TROPONINI 0.01 05/20/2017    TROPONINI 0.01 05/20/2017     LDH:  No results found for: LDH  PT/INR:    Lab Results   Component Value Date    PROTIME 12.6 01/10/2020    INR 1.1 01/10/2020     BNP: No results for input(s): BNP in the last 72 hours.    TSH:   Lab Results   Component Value Date    TSH 3.480 12/05/2019      Cardiac Injury Profile:   Recent Labs     01/10/20  1219   TROPONINI <0.01      Lipid Profile:   Lab Results   Component Value Date    TRIG 150 12/05/2019    HDL 58 12/05/2019    LDLCALC 62 12/05/2019    CHOL 150 12/05/2019      Hemoglobin A1C: No components found for: HGBA1C   U/A:   Lab Results   Component Value Date    NITRITE neg 11/20/2019    LEUKOCYTESUR neg 11/20/2019    LEUKOCYTESUR Negative 05/19/2017    PHUR 7.0 11/20/2019    PHUR 5.0 05/19/2017    WBCUA 1-3 05/19/2017    RBCUA NONE 05/19/2017    BACTERIA FEW 05/19/2017    SPECGRAV 1.020 11/20/2019    SPECGRAV 1.025 05/19/2017    BLOODU neg 11/20/2019    BLOODU SMALL 05/19/2017    GLUCOSEU 100 mg 11/20/2019    GLUCOSEU 100 05/19/2017         ADMISSION SCHEDULED MEDS:   Current Facility-Administered Medications   Medication Dose Route Frequency Provider Last Rate Last Dose    ipratropium-albuterol (DUONEB) nebulizer solution 1 ampule  1 ampule Inhalation Q4H PRN Adelina Amaya MD   1 ampule at 01/12/20 5344    formoterol (PERFOROMIST) nebulizer solution 20 mcg  20 mcg Nebulization BID Adelina Amaya MD   20 mcg at 01/12/20 2110    budesonide (PULMICORT) nebulizer suspension 500 mcg  0.5 mg Nebulization BID Adelina Amaya MD   500 mcg at 01/12/20 2110    albuterol (PROVENTIL) nebulizer solution 2.5 mg  2.5 mg Nebulization Q4H Adelina Amaya MD   2.5 mg at 01/13/20 0100    insulin glargine (LANTUS) injection vial 20 Units  20 Units Subcutaneous Nightly Chace Carter MD   20 Units at 01/12/20 2133    methylPREDNISolone sodium (SOLU-MEDROL) injection 60 mg  60 mg Intravenous Q8H Adelina Amaya MD   60 mg at 01/13/20 0314    acetaminophen (TYLENOL) tablet 650 mg  650 mg Oral Q6H PRN Chace Carter MD   650 mg at 01/12/20 2132    aspirin chewable tablet 81 mg  81 mg Oral Daily Chace Carter MD   81 mg at 01/12/20 8885    finasteride (PROSCAR) tablet 5 mg  5 mg Oral Daily Chace Carter MD   5 mg at 01/12/20 1064    furosemide (LASIX) tablet 20 mg  20 mg Oral Once per day on Mon Wed Fri Chace Carter MD        rosuvastatin (CRESTOR) tablet 10 mg  10 mg Oral Daily Chace Carter MD   10 mg at 01/12/20 2124    sodium chloride flush 0.9 % injection 10 mL  10 mL Intravenous 2 times per day Chace Carter MD   10 mL at 01/12/20 2133    sodium chloride flush 0.9 % injection 10 mL  10 mL Intravenous PRN Chace Carter MD        magnesium hydroxide (MILK OF MAGNESIA) 400 MG/5ML suspension 30 mL  30 mL Oral Daily PRN Chace Carter MD        ondansetron [facility]) injection 4 mg  4 mg Intravenous Q6H PRN Chace Carter MD        enoxaparin (LOVENOX) injection 40 mg  40 mg Subcutaneous Daily Chace Carter MD   40 mg at 01/12/20 0953    cefTRIAXone (ROCEPHIN) 1 g in sterile water 10 mL IV syringe  1 g Intravenous Q24H Chace Carter MD   1 g at 01/12/20 6400    doxycycline hyclate (VIBRAMYCIN)

## 2020-01-13 NOTE — PROGRESS NOTES
01/13/20 0100   NIV Type   $NIV $Daily Charge   Skin Assessment Clean, dry, & intact   Skin Protection for O2 Device Yes   Equipment Type v60   Mode Bilevel   Mask Type Full face mask   Mask Size Medium   Bonnet size Medium   Settings/Measurements   IPAP 12 cmH20   CPAP/EPAP 6 cmH2O   Resp 20   FiO2  40 %   I Time/ I Time % 1 s   Vt Exhaled 490 mL   Minute Volume 13 Liters   Mask Leak (lpm) 33 lpm   Comfort Level Good   Using Accessory Muscles No   SpO2 98   Date: 1/13/2020    Time: 0100  BIPAP RECHECK   Facial area red/color change? No           If YES are Blister/Lesion present? No   If yes must notify nursing staff  BIPAP/CPAP skin barrier?   Yes    Skin barrier type:mepilex       Comments:12/6        Dereje Gonzales

## 2020-01-13 NOTE — CARE COORDINATION
Met with patient at bedside to discuss transition of care. He lives with his niece in a 1 story home. He has a walker and cane that he rarely uses. His niece stated he was fairly independent with ADL's pta. No home O2. Plan at discharge is Home with no anticipated needs. Niece will provide transportation home. IV Solu Medrol continues.  CM will continue to follow

## 2020-01-14 LAB
ANION GAP SERPL CALCULATED.3IONS-SCNC: 11 MMOL/L (ref 7–16)
BUN BLDV-MCNC: 38 MG/DL (ref 8–23)
CALCIUM SERPL-MCNC: 8.9 MG/DL (ref 8.6–10.2)
CHLORIDE BLD-SCNC: 104 MMOL/L (ref 98–107)
CO2: 25 MMOL/L (ref 22–29)
CREAT SERPL-MCNC: 1.3 MG/DL (ref 0.7–1.2)
GFR AFRICAN AMERICAN: >60
GFR NON-AFRICAN AMERICAN: 52 ML/MIN/1.73
GLUCOSE BLD-MCNC: 242 MG/DL (ref 74–99)
HCT VFR BLD CALC: 44.3 % (ref 37–54)
HEMOGLOBIN: 13.7 G/DL (ref 12.5–16.5)
MCH RBC QN AUTO: 29.7 PG (ref 26–35)
MCHC RBC AUTO-ENTMCNC: 30.9 % (ref 32–34.5)
MCV RBC AUTO: 96.1 FL (ref 80–99.9)
METER GLUCOSE: 245 MG/DL (ref 74–99)
METER GLUCOSE: 290 MG/DL (ref 74–99)
METER GLUCOSE: 303 MG/DL (ref 74–99)
METER GLUCOSE: 404 MG/DL (ref 74–99)
PDW BLD-RTO: 13.2 FL (ref 11.5–15)
PLATELET # BLD: 260 E9/L (ref 130–450)
PMV BLD AUTO: 10.8 FL (ref 7–12)
POTASSIUM REFLEX MAGNESIUM: 4.9 MMOL/L (ref 3.5–5)
PROCALCITONIN: 0.39 NG/ML (ref 0–0.08)
RBC # BLD: 4.61 E12/L (ref 3.8–5.8)
SODIUM BLD-SCNC: 140 MMOL/L (ref 132–146)
WBC # BLD: 15.3 E9/L (ref 4.5–11.5)

## 2020-01-14 PROCEDURE — 36415 COLL VENOUS BLD VENIPUNCTURE: CPT

## 2020-01-14 PROCEDURE — 97530 THERAPEUTIC ACTIVITIES: CPT

## 2020-01-14 PROCEDURE — 6370000000 HC RX 637 (ALT 250 FOR IP): Performed by: INTERNAL MEDICINE

## 2020-01-14 PROCEDURE — 84145 PROCALCITONIN (PCT): CPT

## 2020-01-14 PROCEDURE — 6360000002 HC RX W HCPCS: Performed by: NURSE PRACTITIONER

## 2020-01-14 PROCEDURE — 94640 AIRWAY INHALATION TREATMENT: CPT

## 2020-01-14 PROCEDURE — 2580000003 HC RX 258: Performed by: INTERNAL MEDICINE

## 2020-01-14 PROCEDURE — 97166 OT EVAL MOD COMPLEX 45 MIN: CPT

## 2020-01-14 PROCEDURE — 2700000000 HC OXYGEN THERAPY PER DAY

## 2020-01-14 PROCEDURE — 97535 SELF CARE MNGMENT TRAINING: CPT

## 2020-01-14 PROCEDURE — 85027 COMPLETE CBC AUTOMATED: CPT

## 2020-01-14 PROCEDURE — 97162 PT EVAL MOD COMPLEX 30 MIN: CPT

## 2020-01-14 PROCEDURE — 94660 CPAP INITIATION&MGMT: CPT

## 2020-01-14 PROCEDURE — 2060000000 HC ICU INTERMEDIATE R&B

## 2020-01-14 PROCEDURE — 6360000002 HC RX W HCPCS: Performed by: INTERNAL MEDICINE

## 2020-01-14 PROCEDURE — 80048 BASIC METABOLIC PNL TOTAL CA: CPT

## 2020-01-14 PROCEDURE — 82962 GLUCOSE BLOOD TEST: CPT

## 2020-01-14 RX ADMIN — ALBUTEROL SULFATE 2.5 MG: 2.5 SOLUTION RESPIRATORY (INHALATION) at 01:32

## 2020-01-14 RX ADMIN — INSULIN LISPRO 4 UNITS: 100 INJECTION, SOLUTION INTRAVENOUS; SUBCUTANEOUS at 09:34

## 2020-01-14 RX ADMIN — SODIUM CHLORIDE, PRESERVATIVE FREE 10 ML: 5 INJECTION INTRAVENOUS at 20:30

## 2020-01-14 RX ADMIN — INSULIN GLARGINE 25 UNITS: 100 INJECTION, SOLUTION SUBCUTANEOUS at 20:25

## 2020-01-14 RX ADMIN — INSULIN LISPRO 6 UNITS: 100 INJECTION, SOLUTION INTRAVENOUS; SUBCUTANEOUS at 16:44

## 2020-01-14 RX ADMIN — FORMOTEROL FUMARATE DIHYDRATE 20 MCG: 20 SOLUTION RESPIRATORY (INHALATION) at 20:11

## 2020-01-14 RX ADMIN — ALBUTEROL SULFATE 2.5 MG: 2.5 SOLUTION RESPIRATORY (INHALATION) at 13:03

## 2020-01-14 RX ADMIN — ALBUTEROL SULFATE 2.5 MG: 2.5 SOLUTION RESPIRATORY (INHALATION) at 18:05

## 2020-01-14 RX ADMIN — BUDESONIDE 500 MCG: 0.5 SUSPENSION RESPIRATORY (INHALATION) at 07:44

## 2020-01-14 RX ADMIN — INSULIN LISPRO 6 UNITS: 100 INJECTION, SOLUTION INTRAVENOUS; SUBCUTANEOUS at 20:25

## 2020-01-14 RX ADMIN — DOXYCYCLINE HYCLATE 100 MG: 100 CAPSULE ORAL at 09:31

## 2020-01-14 RX ADMIN — ENOXAPARIN SODIUM 40 MG: 40 INJECTION SUBCUTANEOUS at 09:32

## 2020-01-14 RX ADMIN — SODIUM CHLORIDE, PRESERVATIVE FREE 10 ML: 5 INJECTION INTRAVENOUS at 09:32

## 2020-01-14 RX ADMIN — IPRATROPIUM BROMIDE AND ALBUTEROL SULFATE 1 AMPULE: 2.5; .5 SOLUTION RESPIRATORY (INHALATION) at 13:03

## 2020-01-14 RX ADMIN — ASPIRIN 81 MG 81 MG: 81 TABLET ORAL at 09:32

## 2020-01-14 RX ADMIN — DOXYCYCLINE HYCLATE 100 MG: 100 CAPSULE ORAL at 20:25

## 2020-01-14 RX ADMIN — METHYLPREDNISOLONE SODIUM SUCCINATE 40 MG: 40 INJECTION, POWDER, FOR SOLUTION INTRAMUSCULAR; INTRAVENOUS at 12:51

## 2020-01-14 RX ADMIN — BUDESONIDE 500 MCG: 0.5 SUSPENSION RESPIRATORY (INHALATION) at 20:10

## 2020-01-14 RX ADMIN — INSULIN LISPRO 8 UNITS: 100 INJECTION, SOLUTION INTRAVENOUS; SUBCUTANEOUS at 12:42

## 2020-01-14 RX ADMIN — FORMOTEROL FUMARATE DIHYDRATE 20 MCG: 20 SOLUTION RESPIRATORY (INHALATION) at 07:44

## 2020-01-14 RX ADMIN — ALBUTEROL SULFATE 2.5 MG: 2.5 SOLUTION RESPIRATORY (INHALATION) at 20:10

## 2020-01-14 RX ADMIN — SODIUM CHLORIDE, PRESERVATIVE FREE 10 ML: 5 INJECTION INTRAVENOUS at 00:21

## 2020-01-14 RX ADMIN — METHYLPREDNISOLONE SODIUM SUCCINATE 40 MG: 40 INJECTION, POWDER, FOR SOLUTION INTRAMUSCULAR; INTRAVENOUS at 23:15

## 2020-01-14 RX ADMIN — ALBUTEROL SULFATE 2.5 MG: 2.5 SOLUTION RESPIRATORY (INHALATION) at 07:43

## 2020-01-14 RX ADMIN — METHYLPREDNISOLONE SODIUM SUCCINATE 40 MG: 40 INJECTION, POWDER, FOR SOLUTION INTRAMUSCULAR; INTRAVENOUS at 00:21

## 2020-01-14 RX ADMIN — ROSUVASTATIN CALCIUM 10 MG: 10 TABLET, FILM COATED ORAL at 09:31

## 2020-01-14 RX ADMIN — FINASTERIDE 5 MG: 5 TABLET, FILM COATED ORAL at 09:31

## 2020-01-14 RX ADMIN — CEFTRIAXONE SODIUM 1 G: 1 INJECTION, POWDER, FOR SOLUTION INTRAMUSCULAR; INTRAVENOUS at 16:12

## 2020-01-14 ASSESSMENT — PAIN SCALES - GENERAL
PAINLEVEL_OUTOF10: 0

## 2020-01-14 NOTE — PROGRESS NOTES
[facility]                  [address] · [address] [address] · [place]    [phone]   · Fax  [phone] ·    Arabella Julio M.D., Gabriela Leo D.O., F.A.C.O.I., [place] M.D.,   Alicia Hoff M.D., Lakeisha Rayo M.D. Moustapha Vázquez D.O. Pulmonary Progress Note    CC:  Pneumonia     Subjective:  No acute events overnight   Feels SOB has improved. Still on supplemental O2 and wore BIPAP overnight. Intake/Output Summary (Last 24 hours) at 1/14/2020 1502  Last data filed at 1/14/2020 1409  Gross per 24 hour   Intake 460 ml   Output 700 ml   Net -240 ml       Diet/Nutrition   DIET CARB CONTROL;    Vitals:   BP (!) 150/72   Pulse 75   Temp 96.8 °F (36 °C) (Temporal)   Resp 18   Ht 6' (1.829 m)   Wt 212 lb 14.4 oz (96.6 kg)   SpO2 98%   BMI 28.87 kg/m²  on     I/O   I/O (24 Hours)    Patient Vitals for the past 8 hrs:   BP Temp Temp src Pulse Resp   01/14/20 0815 (!) 150/72 96.8 °F (36 °C) Temporal 75 18   01/14/20 0812 (!) 140/65 97 °F (36.1 °C) Temporal 78 18       Intake/Output Summary (Last 24 hours) at 1/14/2020 1502  Last data filed at 1/14/2020 1409  Gross per 24 hour   Intake 460 ml   Output 700 ml   Net -240 ml     I/O last 3 completed shifts:   In: 26 [P.O.:460]  Out: 700 [Urine:700]   Date 01/14/20 0000 - 01/14/20 2359   Shift 9972-9386 3424-0880 3197-4241 24 Hour Total   INTAKE   P.O.(mL/kg/hr) 100(0.1) 360  460   Shift Total(mL/kg) 100(1) 360(3.7)  460(4.8)   OUTPUT   Urine(mL/kg/hr) 450(0.6) 250  700   Emesis/NG output(mL/kg) 0(0)   0(0)   Other(mL/kg) 0(0)   0(0)   Stool(mL/kg) 0(0)   0(0)   Blood(mL/kg) 0(0)   0(0)   Shift Total(mL/kg) 450(4.7) 250(2.6)  700(7.2)   Weight (kg) 96.6 96.6 96.6 96.6     Patient Vitals for the past 96 hrs (Last 3 readings):   Weight   01/11/20 0618 212 lb 14.4 oz (96.6 kg)         PHYSICAL EXAMINATION:  General: The patient is lying in bed comfortably without any distress. Breathing is not labored  HEENT: Pupils are equal round and reactive to light, there are no oral lesions and no post-nasal drip   Neck: supple without adenopathy  Cardiovascular: regular rate and rhythm without murmur or gallop  Respiratory: Diminished breath sounds bilaterally Air entry is symmetric  Abdomen: soft, non-tender, non-distended, normal bowel sounds  Extremities: warm, no edema, no clubbing  Skin: no rash or lesion  Neurologic: CN II-XII grossly intact, no focal deficits    Medications:    Scheduled Meds:   insulin glargine  25 Units Subcutaneous Nightly    methylPREDNISolone  40 mg Intravenous Q12H    formoterol  20 mcg Nebulization BID    budesonide  0.5 mg Nebulization BID    albuterol  2.5 mg Nebulization Q4H    aspirin  81 mg Oral Daily    finasteride  5 mg Oral Daily    furosemide  20 mg Oral Once per day on Mon Wed Fri    rosuvastatin  10 mg Oral Daily    sodium chloride flush  10 mL Intravenous 2 times per day    enoxaparin  40 mg Subcutaneous Daily    cefTRIAXone (ROCEPHIN) IV  1 g Intravenous Q24H    doxycycline hyclate  100 mg Oral 2 times per day    insulin lispro  0-12 Units Subcutaneous TID WC    insulin lispro  0-6 Units Subcutaneous Nightly       Continuous Infusions:   dextrose      sodium chloride 75 mL/hr at 01/11/20 0618       PRN Meds:  ipratropium-albuterol, acetaminophen, sodium chloride flush, magnesium hydroxide, ondansetron, glucose, dextrose, glucagon (rDNA), dextrose    Labs:  CBC:   Recent Labs     01/14/20  0902   WBC 15.3*   HGB 13.7   HCT 44.3   MCV 96.1        BMP:   Recent Labs     01/14/20  0429      K 4.9      CO2 25   BUN 38*   CREATININE 1.3*     LIVER PROFILE:   No results for input(s): AST, ALT, LIPASE, BILIDIR, BILITOT, ALKPHOS in the last 72 hours. Invalid input(s): AMYLASE,  ALB  PT/INR:   No results for input(s): PROTIME, INR in the last 72 hours. APTT:   No results for input(s):  APTT in the last 72

## 2020-01-14 NOTE — CARE COORDINATION
Plan at discharge remains Home with no anticipated needs. Will need ambulation testing prior to discharge. If home O2 is needed, he would like [facility] dme. Niece will provide transportation home. CM will continue to follow    The Plan for Transition of Care is related to the following treatment goals: discharge planning when medically stable    The Patient and niece were provided with a choice of provider and agrees with the discharge plan. [x] Yes [] No    Freedom of choice list was provided with basic dialogue that supports the patient's individualized plan of care/goals, treatment preferences and shares the quality data associated with the providers.  [x] Yes [] No

## 2020-01-14 NOTE — PROGRESS NOTES
Assessment:   Initial Eval Status  Date: 1-14-20 Treatment Status  Date: Short Term Goals  Treatment frequency: PRN 2-4 x/week  1 week   Feeding SUP/Set up    Assist to open containers on tray, able to feed self     Grooming Close SUP/Set up/Mod VCs     Able to stand at the sink w/ Close SUP for safety w/ standing, Mod VCs/Visual Cues to wash hands/comb hair  SUP  Standing At The Sink   UB Dressing Min A/Mod VCs    Required Min A for safety w/ standing balance, Min A to don bathrobe in standing, Mod VCs to problem solve task  SUP   LB Dressing Max A/Mod VCs    Required Max A to don/doff socks while sitting in chair despite pt ed for adaptive tech, Min A for standing balance + Mod A to pull garments over hips  Mod A   Bathing NT      Mod A   Toileting NT      Min A   Bed Mobility  Rolling:  NT  Repositioning:  NT   Supine to Sit:  NT    Sit to Supine:  NT     Pt seated in chair at start of session   SUP   Functional Transfers Sit to stand:  Min A  Stand to sit:  Min A      Required Min A + Mod VCs for safety to stand from Armed Chair 2x  Pt ed re: safety/hand placement  SUP   Functional Mobility Min A/Mod VCs w/ FWW    Does not typically use FWW - required Mod VCs for use of walker, Walker safety, Min A for balance - short distance in room ~ 6' 2x  SUP   Balance Sitting:  SUP     Static:  SUP    Dynamic:  SUP - in chair w/ functional ax    Standing:  Min A + use of FWW     Static:  Min A    Dynamic:  Min A     Activity Tolerance Tolerated Sitting:   In chair extended time prior to, during and after sesson  Tolerated Standing:  ~ 2 mins 1st trial, 7 mins 2nd trial     Visual/  Perceptual WFL  Glasses:  Yes      Hearing [place] - Speak Loudly into Right Ear  Hearing Aids  None       Hand dominance: Right    UE ROM: RUE:  WFL      LUE:  WFL    Strength: RUE: grossly 4+/5     LUE: grossly 4+/5     Strength:  WFL [place] UEs    Fine Motor Coordination:  WFL [place] UEs    Sensation:  Denies numbness or tingling [place] UEs  Tone: Coordination [] ROM [] Delirium []                  Motor Control []    Plan of Care:   ADL retraining [x]   Equipment needs [x]   Neuromuscular re-education [x] Energy Conservation Techniques [x]  Functional Transfer training [x] Patient and/or Family Education [x]  Functional Mobility training [x]  Environmental Modifications [x]  Cognitive re-training [x]   Compensatory techniques for ADLs [x]  Splinting Needs []   Positioning to improve overall function [x]   Therapeutic Activity [x]   Therapeutic Exercise  [x]  Visual/Perceptual: [x]    Delirium prevention/treatment  []  Other:  [x]    Rehab Potential:  Fair (+) for established goals    Patient / Family Goal:  Not stated at this time     Patient and/or Family were instructed on Functional Diagnosis, Prognosis/Goals and OT Plan of Care. Demonstrated Fair(+) understanding. Evaluation Time includes thorough review of current medical information, gathering information on past medical history/social history and prior level of function, completion of standardized testing/informal observation of tasks, assessment of data and education on plan of care and goals.         Mod Evaluation + 24 timed treatment minutes  Tx Time in:  0930  Tx Time out:  [address] [place], [place], OTR/L #[id number]

## 2020-01-14 NOTE — PROGRESS NOTES
Kvng Flores MD, FACP                   Patient Name: Day Gooden                   Age:  80 y.o. Gender:   male    CC: Shortness of breath and cough    HPI: This 80-year-old male patient presented to the emergency room with a 1 day history of increasing shortness of breath and coughing.   He has a history of COPD and follows with pulmonary medicine  He denied any other systemic symptoms no fever or chills    Evaluation in the hospital emergency room demonstrated multifocal infiltrates which were changed from his previous CT scan in 2015  It also demonstrated a nodule--  He was then started on treatment for immunity acquired pneumonia  He had a leukocytosis as well-has elevated procalcitonin    This morning he is wearing BiPAP  Is still short of breath  Oxygen saturation ranges from 96 to 99% on 3 L  There is no fever    Morning the white cell count has dropped from 27-22    1/12  Patient is still having a considerable amount of difficulty breathing  He has been evaluated by pulmonary medicine  Steroids are to continue cultures have been requested bronchodilators are continued noninvasive positive pressure ventilation continues chest x-ray for today  The patient is awake and alert    1/13  He sounds considerably improved this morning  Is wearing his CPAP  Not have audible wheezes  More awake and alert  Viewed pulmonary note-continue the same treatment plan  Glycemic control is a bit better    1/14  He is more comfortable today and breathing easier  Pulmonary service has ordered steroid taper  Pro-calcitonin ordered and positive    Past Medical History:   Diagnosis Date    Arthritis 2015    rheumatoid    COPD (chronic obstructive pulmonary disease) ([address]     Diabetes mellitus ([address]     Hearing aid worn     bilateral    Hyperlipidemia        Past Surgical History: 13.7 01/11/2020    HCT 44.6 01/11/2020     01/11/2020    MCV 96.5 01/11/2020     BMP:    Lab Results   Component Value Date     01/11/2020    K 4.6 01/11/2020     01/11/2020    CO2 22 01/11/2020    BUN 26 01/11/2020    CREATININE 1.3 01/11/2020    GLUCOSE 273 01/11/2020    CALCIUM 8.4 01/11/2020     Hepatic Function Panel:    Lab Results   Component Value Date    ALKPHOS 89 01/10/2020    AST 36 01/10/2020    ALT 19 01/10/2020    PROT 6.4 01/10/2020    LABALBU 3.2 01/10/2020    BILITOT 0.6 01/10/2020     Magnesium:  No results found for: MG  Cardiac Enzymes:   Lab Results   Component Value Date    CKTOTAL 451 (H) 05/22/2017    CKTOTAL 749 (H) 05/20/2017    CKTOTAL 1,161 (H) 05/20/2017    CKMB 3.3 05/21/2017    CKMB 3.6 05/20/2017    CKMB 4.5 05/20/2017    TROPONINI <0.01 01/10/2020    TROPONINI 0.01 05/20/2017    TROPONINI 0.01 05/20/2017     LDH:  No results found for: LDH  PT/INR:    Lab Results   Component Value Date    PROTIME 12.6 01/10/2020    INR 1.1 01/10/2020     BNP: No results for input(s): BNP in the last 72 hours. TSH:   Lab Results   Component Value Date    TSH 3.480 12/05/2019      Cardiac Injury Profile:   No results for input(s): CKTOTAL, CKMB, CKMBINDEX, TROPONINI in the last 72 hours.    Lipid Profile:   Lab Results   Component Value Date    TRIG 150 12/05/2019    HDL 58 12/05/2019    LDLCALC 62 12/05/2019    CHOL 150 12/05/2019      Hemoglobin A1C: No components found for: HGBA1C   U/A:   Lab Results   Component Value Date    NITRITE neg 11/20/2019    LEUKOCYTESUR neg 11/20/2019    LEUKOCYTESUR Negative 05/19/2017    PHUR 7.0 11/20/2019    PHUR 5.0 05/19/2017    WBCUA 1-3 05/19/2017    RBCUA NONE 05/19/2017    BACTERIA FEW 05/19/2017    SPECGRAV 1.020 11/20/2019    SPECGRAV 1.025 05/19/2017    BLOODU neg 11/20/2019    BLOODU SMALL 05/19/2017    GLUCOSEU 100 mg 11/20/2019    GLUCOSEU 100 05/19/2017         ADMISSION SCHEDULED MEDS:   Current Facility-Administered Medications Medication Dose Route Frequency Provider Last Rate Last Dose    insulin glargine (LANTUS) injection vial 25 Units  25 Units Subcutaneous Nightly Betzaida Murray MD   25 Units at 01/13/20 2108    methylPREDNISolone sodium (SOLU-MEDROL) injection 40 mg  40 mg Intravenous Q12H BETSEY Ross - CNP   40 mg at 01/14/20 0021    ipratropium-albuterol (DUONEB) nebulizer solution 1 ampule  1 ampule Inhalation Q4H PRN Jacky Santos MD   1 ampule at 01/12/20 0926    formoterol (PERFOROMIST) nebulizer solution 20 mcg  20 mcg Nebulization BID Jacky Santos MD   20 mcg at 01/14/20 0744    budesonide (PULMICORT) nebulizer suspension 500 mcg  0.5 mg Nebulization BID Jacky Santos MD   500 mcg at 01/14/20 0744    albuterol (PROVENTIL) nebulizer solution 2.5 mg  2.5 mg Nebulization Q4H Jacky Santos MD   2.5 mg at 01/14/20 0743    acetaminophen (TYLENOL) tablet 650 mg  650 mg Oral Q6H PRN Betzaida Murray MD   650 mg at 01/12/20 2132    aspirin chewable tablet 81 mg  81 mg Oral Daily Betzaida Murray MD   81 mg at 01/13/20 2468    finasteride (PROSCAR) tablet 5 mg  5 mg Oral Daily Betzaida Murray MD   5 mg at 01/13/20 5372    furosemide (LASIX) tablet 20 mg  20 mg Oral Once per day on Mon Wed Fri Betzaida Murray MD   20 mg at 01/13/20 4369    rosuvastatin (CRESTOR) tablet 10 mg  10 mg Oral Daily Betzaida Murray MD   10 mg at 01/13/20 1693    sodium chloride flush 0.9 % injection 10 mL  10 mL Intravenous 2 times per day Betzaida Murray MD   10 mL at 01/13/20 2108    sodium chloride flush 0.9 % injection 10 mL  10 mL Intravenous PRN Betzaida Murray MD   10 mL at 01/14/20 0021    magnesium hydroxide (MILK OF MAGNESIA) 400 MG/5ML suspension 30 mL  30 mL Oral Daily PRN Betzaida Murray MD        ondansetron [facility]) injection 4 mg  4 mg Intravenous Q6H PRN Betzaida Murray MD        enoxaparin (LOVENOX) injection 40 mg  40 mg Subcutaneous Daily Betzaida Murray MD   40 mg at 01/13/20 0904    cefTRIAXone (ROCEPHIN) 1 g in sterile water 10 mL IV syringe  1 g Intravenous Q24H Allyssa Guallpa MD   1 g at 01/13/20 1530    doxycycline hyclate (VIBRAMYCIN) capsule 100 mg  100 mg Oral 2 times per day Allyssa Guallpa MD   100 mg at 01/13/20 2108    glucose (GLUTOSE) 40 % oral gel 15 g  15 g Oral PRN Allyssa Guallpa MD        dextrose 50 % IV solution  12.5 g Intravenous PRN Allyssa Guallpa MD        glucagon (rDNA) injection 1 mg  1 mg Intramuscular PRN Allyssa Guallpa MD        dextrose 5 % solution  100 mL/hr Intravenous PRN Allyssa Guallpa MD        insulin lispro (HUMALOG) injection vial 0-12 Units  0-12 Units Subcutaneous TID WC Allyssa Guallpa MD   6 Units at 01/13/20 1733    insulin lispro (HUMALOG) injection vial 0-6 Units  0-6 Units Subcutaneous Nightly Allyssa Guallpa MD   3 Units at 01/13/20 2107    0.9 % sodium chloride infusion   Intravenous Continuous Allyssa Guallpa MD 75 mL/hr at 01/11/20 8465         Current  Infusions   dextrose      sodium chloride 75 mL/hr at 01/11/20 0618       Prn Meds  ipratropium-albuterol, acetaminophen, sodium chloride flush, magnesium hydroxide, ondansetron, glucose, dextrose, glucagon (rDNA), dextrose    Radiology Review:  XR CHEST PORTABLE   Final Result   tortuous ectatic aorta   Airspace disease,  possibly atelectasis, pneumonia, or possibly scar   at both lung bases   There is a poor inspiratory effort   The chest does not appear to be significantly changed in the interval               XR CHEST PORTABLE   Final Result   tortuous ectatic aorta   Airspace disease compatible with scar, chronic infiltrate could give   this appearance, unchanged from previous                     XR CHEST PORTABLE   Final Result   No interval change               CTA PULMONARY W CONTRAST   Final Result      1. No pulmonary embolism.    2. Small consolidative opacities in the right lower lobe and left   lower lobe, larger in the latter, which have developed in the interim   since the prior study from 5/23/2015 and are likely   infectious/inflammatory. 3. New 6 mm nodular density in the left upper lobe abutting the   fissure. Follow-up CT of the chest recommended in 6-12 months. 4. Stable prominent scarring along the lung bases. XR CHEST PORTABLE   Final Result   Progressive interstitial density in both lower lungs suggest a   spectrum of fibrosis or scarring without consolidation, effusion, or   CHF.             ASSESSMENT:  Active diagnoses treated at this admission:  · Multifocal pneumonia  · Community-acquired  · Interstitial lung disease  · Baseline COPD  · Type 2 diabetes mellitus-exacerbation with steroid  · Obesity    Problem list:  Patient Active Problem List   Diagnosis    Pneumonia    Diarrhea    [facility]    Rheumatoid arthritis [facility]    CKD (chronic kidney disease), stage III (HCC)    Non-traumatic rhabdomyolysis    Nausea and vomiting    Aspiration pneumonia (HCC) - possible based on Hx and CXR    Dysphagia    Hiatal hernia with GERD and esophagitis (EGD 5-23-17: Dr. Malu Oglesby)   [facility] Hemorrhagic gastritis (EGD 5-23-17: Dr. Malu Oglesby)   [employer] [employer] Duodenitis (EGD 5-23-17: Dr. Malu Oglesby)   Neisha Bio Acute superficial gastritis without hemorrhage       PLAN:  Based on hemoglobin A1c his diabetes is not well controlled  Continue the antibiotic therapy for pneumonia  Continue aerosol treatments  DVT prophylaxis  More aggressive glycemic control will be required due to the steroid-and this has improved  Chest x-ray reviewed      See  Orders  Isha Herrera MD, Gopal Pena, American Board of Internal Medicine  Diplomate, [address] of Internal Medicine  8:08 AM  1/14/2020

## 2020-01-14 NOTE — PROGRESS NOTES
Physical Therapy  Initial Assessment     Name: Day Gooden  : 1934  MRN: [id number]    Date of Service: 2020    Evaluating PT: Padmini Spencer PT, DPT [id number]    Room #:  9347/7382-S    Diagnosis: pneumonia   Precautions: NC 3L, falls, [place] hears better in R ear  PMHx: RA, COPD, DM, HLD  Recommended DME:  [employer] vs [employer]     Pt lives with great niece in a 1 story house with 3 stair(s) and 2 rail(s) to enter. Bed is on the first floor and bath is on the first floor. Pt ambulated with no AD independently prior to admission. Per pt's niece pt has  family support. HPI: Pt presented to the ED on 1/10/20 for SOB     Initial Evaluation  Date: 20 Treatment Date: Short Term/ Long Term   Goals   AM-PAC 6 Clicks 89/77     Was pt agreeable to Eval/treatment? yes     Does pt have pain? no     Bed Mobility  Rolling: NT  Supine to sit: NT  Sit to supine: NT  Scooting: nt  Rolling: Independent  Supine to sit: Independent  Sit to supine: Independent  Scooting: Independent   Transfers Sit to stand: Shameka  Stand to sit: Shameka  Stand pivot: Shameka  Sit to stand: modified Independent  Stand to sit: modified Independent  Stand pivot: modified Independent   Ambulation   50 x 2 feet with front [employer] with Shameka  100 feet with front appropriate AD with SBA   Stair negotiation: NT  4 step(s) with 2 rail(s) with SBA   ROM B UE: per OT note  B LE: WNL     Strength B UE: per OT note  B LE: NT     Balance Sitting EOB: SBA  Dynamic standing: Shameka  Sitting EOB: Independent  Dynamic standing: Independent     Pt is A & O x: 4  Sensation: WNL  Coordination: NT  Edema: unremarkable    ASSESSMENT    Patient education  Pt educated on role of physical therapy intervention. Appropriate utilization of front [employer] during transfers and gait. Importance of OOB activity for increasing functional mobility during acute care stay.     Patient response to education:   Pt verbalized understanding Pt demonstrated skill Pt requires further education in this area   yes yes yes     Comments:  Pt received seated EOB working with OT and was agreeable to PT intervention at this time. During ambulation pt desaturated to 82% oxygen was increased to 3L from 2L and pt took standing rest break. Pt quickly recovered with standing rest to 92%. SPO2 remained >90% on 3L. Pt was returned to chair in room and repositioned for comfort. Family and pt educated on importance of OOB to improve oxygen saturation. Pt agreeable at PT education at this time. Pt left in chair with call light in reach. RN notified of desaturation during ambulation. Pt will continue to benefit from skilled PT intervention for the purposes of maximizing functional mobility. Pts/family goals:  Per niece, family would like home PT upon discharge. Patient and or family understand(s) diagnosis, prognosis, and plan of care:  yes    PLAN  PT care will be provided in accordance with the objectives noted above. Whenever appropriate, clear delegation orders will be provided for nursing staff. Exercises and functional mobility practice will be used as well as appropriate assistive devices or modalities to obtain goals. Patient and family education will also be administered as needed. Frequency of treatments: 2-5x/week x 5 days.     Time in: 0997  Time out: [facility], PT, DPT  [id number]

## 2020-01-15 LAB
BLOOD CULTURE, ROUTINE: NORMAL
CULTURE, BLOOD 2: NORMAL
METER GLUCOSE: 263 MG/DL (ref 74–99)
METER GLUCOSE: 299 MG/DL (ref 74–99)
METER GLUCOSE: 371 MG/DL (ref 74–99)
METER GLUCOSE: 394 MG/DL (ref 74–99)

## 2020-01-15 PROCEDURE — 82962 GLUCOSE BLOOD TEST: CPT

## 2020-01-15 PROCEDURE — 97535 SELF CARE MNGMENT TRAINING: CPT

## 2020-01-15 PROCEDURE — 6360000002 HC RX W HCPCS: Performed by: INTERNAL MEDICINE

## 2020-01-15 PROCEDURE — 94660 CPAP INITIATION&MGMT: CPT

## 2020-01-15 PROCEDURE — 6370000000 HC RX 637 (ALT 250 FOR IP): Performed by: INTERNAL MEDICINE

## 2020-01-15 PROCEDURE — 97112 NEUROMUSCULAR REEDUCATION: CPT

## 2020-01-15 PROCEDURE — 6360000002 HC RX W HCPCS: Performed by: NURSE PRACTITIONER

## 2020-01-15 PROCEDURE — 94669 MECHANICAL CHEST WALL OSCILL: CPT

## 2020-01-15 PROCEDURE — 94640 AIRWAY INHALATION TREATMENT: CPT

## 2020-01-15 PROCEDURE — 2700000000 HC OXYGEN THERAPY PER DAY

## 2020-01-15 PROCEDURE — 2060000000 HC ICU INTERMEDIATE R&B

## 2020-01-15 PROCEDURE — 2580000003 HC RX 258: Performed by: INTERNAL MEDICINE

## 2020-01-15 RX ADMIN — FORMOTEROL FUMARATE DIHYDRATE 20 MCG: 20 SOLUTION RESPIRATORY (INHALATION) at 20:33

## 2020-01-15 RX ADMIN — FORMOTEROL FUMARATE DIHYDRATE 20 MCG: 20 SOLUTION RESPIRATORY (INHALATION) at 07:45

## 2020-01-15 RX ADMIN — ALBUTEROL SULFATE 2.5 MG: 2.5 SOLUTION RESPIRATORY (INHALATION) at 20:33

## 2020-01-15 RX ADMIN — INSULIN LISPRO 6 UNITS: 100 INJECTION, SOLUTION INTRAVENOUS; SUBCUTANEOUS at 09:04

## 2020-01-15 RX ADMIN — SODIUM CHLORIDE, PRESERVATIVE FREE 10 ML: 5 INJECTION INTRAVENOUS at 12:15

## 2020-01-15 RX ADMIN — ALBUTEROL SULFATE 2.5 MG: 2.5 SOLUTION RESPIRATORY (INHALATION) at 11:25

## 2020-01-15 RX ADMIN — CEFTRIAXONE SODIUM 1 G: 1 INJECTION, POWDER, FOR SOLUTION INTRAMUSCULAR; INTRAVENOUS at 16:08

## 2020-01-15 RX ADMIN — INSULIN GLARGINE 25 UNITS: 100 INJECTION, SOLUTION SUBCUTANEOUS at 21:18

## 2020-01-15 RX ADMIN — ALBUTEROL SULFATE 2.5 MG: 2.5 SOLUTION RESPIRATORY (INHALATION) at 07:44

## 2020-01-15 RX ADMIN — DOXYCYCLINE HYCLATE 100 MG: 100 CAPSULE ORAL at 09:06

## 2020-01-15 RX ADMIN — BUDESONIDE 500 MCG: 0.5 SUSPENSION RESPIRATORY (INHALATION) at 07:45

## 2020-01-15 RX ADMIN — ALBUTEROL SULFATE 2.5 MG: 2.5 SOLUTION RESPIRATORY (INHALATION) at 23:58

## 2020-01-15 RX ADMIN — INSULIN LISPRO 10 UNITS: 100 INJECTION, SOLUTION INTRAVENOUS; SUBCUTANEOUS at 17:24

## 2020-01-15 RX ADMIN — SODIUM CHLORIDE, PRESERVATIVE FREE 10 ML: 5 INJECTION INTRAVENOUS at 09:07

## 2020-01-15 RX ADMIN — SODIUM CHLORIDE, PRESERVATIVE FREE 10 ML: 5 INJECTION INTRAVENOUS at 21:17

## 2020-01-15 RX ADMIN — ALBUTEROL SULFATE 2.5 MG: 2.5 SOLUTION RESPIRATORY (INHALATION) at 01:14

## 2020-01-15 RX ADMIN — INSULIN LISPRO 6 UNITS: 100 INJECTION, SOLUTION INTRAVENOUS; SUBCUTANEOUS at 12:14

## 2020-01-15 RX ADMIN — DOXYCYCLINE HYCLATE 100 MG: 100 CAPSULE ORAL at 21:17

## 2020-01-15 RX ADMIN — ROSUVASTATIN CALCIUM 10 MG: 10 TABLET, FILM COATED ORAL at 09:06

## 2020-01-15 RX ADMIN — ACETAMINOPHEN 650 MG: 325 TABLET, FILM COATED ORAL at 20:21

## 2020-01-15 RX ADMIN — ALBUTEROL SULFATE 2.5 MG: 2.5 SOLUTION RESPIRATORY (INHALATION) at 05:22

## 2020-01-15 RX ADMIN — FINASTERIDE 5 MG: 5 TABLET, FILM COATED ORAL at 09:06

## 2020-01-15 RX ADMIN — ASPIRIN 81 MG 81 MG: 81 TABLET ORAL at 09:07

## 2020-01-15 RX ADMIN — METHYLPREDNISOLONE SODIUM SUCCINATE 40 MG: 40 INJECTION, POWDER, FOR SOLUTION INTRAMUSCULAR; INTRAVENOUS at 12:14

## 2020-01-15 RX ADMIN — ALBUTEROL SULFATE 2.5 MG: 2.5 SOLUTION RESPIRATORY (INHALATION) at 17:09

## 2020-01-15 RX ADMIN — BUDESONIDE 500 MCG: 0.5 SUSPENSION RESPIRATORY (INHALATION) at 20:33

## 2020-01-15 RX ADMIN — FUROSEMIDE 20 MG: 20 TABLET ORAL at 09:06

## 2020-01-15 RX ADMIN — INSULIN LISPRO 5 UNITS: 100 INJECTION, SOLUTION INTRAVENOUS; SUBCUTANEOUS at 21:18

## 2020-01-15 RX ADMIN — ENOXAPARIN SODIUM 40 MG: 40 INJECTION SUBCUTANEOUS at 09:07

## 2020-01-15 ASSESSMENT — PAIN DESCRIPTION - DESCRIPTORS: DESCRIPTORS: ACHING;DISCOMFORT;SORE

## 2020-01-15 ASSESSMENT — PAIN DESCRIPTION - LOCATION: LOCATION: GENERALIZED

## 2020-01-15 ASSESSMENT — PAIN DESCRIPTION - ONSET: ONSET: GRADUAL

## 2020-01-15 ASSESSMENT — PAIN SCALES - GENERAL
PAINLEVEL_OUTOF10: 3
PAINLEVEL_OUTOF10: 0

## 2020-01-15 ASSESSMENT — PAIN DESCRIPTION - FREQUENCY: FREQUENCY: INTERMITTENT

## 2020-01-15 ASSESSMENT — PAIN DESCRIPTION - PAIN TYPE: TYPE: ACUTE PAIN

## 2020-01-15 NOTE — PROGRESS NOTES
Occupational Therapy  OT BEDSIDE TREATMENT NOTE      Date:1/15/2020  Patient Name: Gisselle Jones  MRN: [id number]  : 1934  Room: [address] [place] [address]     Evaluating OT:  MARTY Elias OTR/L #[id number]     AM-PAC Daily Activity Raw Score:    Recommended Adaptive Equipment:  TBD as pt progresses  - Adaptive equipment for LB dressing/Item Retrieval     Reason for Admission:  Pt was admitted w/ SOB, cough, weakness     Diagnosis:  COPD, PNA, Dyspnea      Procedures this admission:  None      Pertinent Medical History:  RA, COPD, DM, [place]      Precautions:  Falls  Very [place] - No Hearing aids - Speak Loudly into Right Ear     Home Living: Pt lives with his Bia Ananth Niece and Great-Great Nephew in a 1-story hosue with 3 DIAMOND and Hunter HR/s. Bed/bath on the main floor. Laundry in the basement - Flight w/ Hunter HR   Bathroom setup:  Tub-Shower, High Commode w/ HRs   Equipment owned:  [facility], [address], Shower Chair - does not use     Available Family Assist:  Niece, [employer], provides / assist      Prior Level of Function:  Mostly IND with ADLs, IADLs, Transfers and Mobility using No AD for ambulation. Occasionally goes down to the basement to do laundry, makes own meals  Driving:  No  Occupation:  None reported at this time     Pain Level:  No c/o pain at this time.    Additional Complaints:  None verbalized     Vitals/Lab Values:  O2 sats 96% at rest seated in chair, decreased to 88% briefly w/ ambulating, returned to 96% after ax seated in chair - w/ NC O2 2 LPM     Cognition: A & O x 2 - Able to state name, , current Month, and year          Able to Follow Simple Commands Min-Mod VCs/Visual Cues and Tactile Cues d/t Severity of his Loss of hearing              Memory:  fair               Sequencing:  fair               Problem solving:  fair               Judgement/safety:  fair   Additional Comments:  Joking with staff despite VCs not to joke during ax d/t limited communication r/t [facility]                   Functional Balance Sitting:  SUP     Static:  SUP    Dynamic:  SUP - in chair w/ functional ax     Standing:  Min A + use of FWW     Static:  Min A    Dynamic:  Min A Sitting:     Static:  SUP    Dynamic:  SBA with activity.     Standing:     Static:  Min A    Dynamic:  Min A      Activity Tolerance Tolerated Sitting: In chair extended time prior to, during and after sesson  Tolerated Standing:  ~ 2 mins 1st trial, 7 mins 2nd trial  Tolerated Sitting:  On EOB and in chair during and after session. Tolerated Standing:  ~ 2 mins 1st trial, 3 mins 2nd trial     Visual/  Perceptual WFL  Glasses: Yes        Hearing [place] - Speak Loudly into Right Ear  Hearing Aids  None          Hand dominance: Right     UE ROM:        RUE:     WFL                                          LUE:     WFL     Strength:        RUE:    grossly 4+/5                               LUE:    grossly 4+/5      Strength:  WFL [place] UEs     Fine Motor Coordination:  WFL [place] UEs     Sensation:  Denies numbness or tingling [place] UEs  Tone:  WFL [place] UEs  Edema:  None Noted                          Comments/Treatment:  Upon arrival, pt was found seated in b/s chair. He was agreeable to participate in assessment ax. His Niece was present during assessment. Received permission from RN prior to engaging pt in assessment ax. At the end of the session, patient was properly positioned in semi-supine. Call light and phone within reach, all lines and tubes intact. Oriented pt to call bell. Made all appropriate Environmental Modifications to facilitate pt's level of IND and safety. All needs met.      Pt would benefit from continued skilled OT services to increase safety and independence with completion of ADL/IADL tasks for functional independence and quality of life    · Pt has made fair progress towards set goals.    · Continue with current plan of care    Time in: 305  Time out: 335  Total Tx Time: 30 mins    Rachael Gaytan, [address]

## 2020-01-15 NOTE — PROGRESS NOTES
Hospitalist Progress Note      PCP: Khurram Cárdenas MD    Date of Admission: 1/10/2020  Days in the hospital: [address] [facility] Course:   Patient is a 72-year-old who comes into hospital with complaints of cough and shortness of breath. He does have a history of COPD and follows up with pulmonary. Patient was noted to have multifocal pneumonia and he was admitted to hospital for further evaluation. He was placed on empiric antibiotics and consultation was placed to pulmonary. He was initially on BiPAP. Currently he is being weaned off oxygen. He also has history of ILD. Subjective  Patient seen and examined at bedside. Patient denies any fevers, chills, chest pain, shortness of breath, nausea, vomiting.   Chart reviewed, night events reviewed      Medications:  Reviewed    Infusion Medications    dextrose      sodium chloride 75 mL/hr at 01/11/20 0618     Scheduled Medications    insulin glargine  25 Units Subcutaneous Nightly    methylPREDNISolone  40 mg Intravenous Q12H    formoterol  20 mcg Nebulization BID    budesonide  0.5 mg Nebulization BID    albuterol  2.5 mg Nebulization Q4H    aspirin  81 mg Oral Daily    finasteride  5 mg Oral Daily    furosemide  20 mg Oral Once per day on Mon Wed Fri    rosuvastatin  10 mg Oral Daily    sodium chloride flush  10 mL Intravenous 2 times per day    enoxaparin  40 mg Subcutaneous Daily    cefTRIAXone (ROCEPHIN) IV  1 g Intravenous Q24H    doxycycline hyclate  100 mg Oral 2 times per day    insulin lispro  0-12 Units Subcutaneous TID WC    insulin lispro  0-6 Units Subcutaneous Nightly     PRN Meds: ipratropium-albuterol, acetaminophen, sodium chloride flush, magnesium hydroxide, ondansetron, glucose, dextrose, glucagon (rDNA), dextrose      Intake/Output Summary (Last 24 hours) at 1/15/2020 0830  Last data filed at 1/15/2020 0519  Gross per 24 hour   Intake 360 ml   Output 600 ml   Net -240 ml       Exam:    BP (!) 140/68   Pulse 62 Temp 96.8 °F (36 °C) (Temporal)   Resp 24   Ht 6' (1.829 m)   Wt 212 lb 14.4 oz (96.6 kg)   SpO2 98%   BMI 28.87 kg/m²     General appearance: No apparent distress, appears stated age and cooperative. HEENT: No pallor, no icterus. Neck: Supple. No lesions, scars or masses. Trachea midline. Respiratory:  CTA, decreased at bases. Cardiovascular: RRR, no murmur. Abdomen: Soft, non-tender, BS noted. Musculoskeletal: No joint pains or joint swelling noted. Neurologic: awake, alert and following commands       Labs:   Recent Labs     01/14/20  0902   WBC 15.3*   HGB 13.7   HCT 44.3        Recent Labs     01/14/20  0429      K 4.9      CO2 25   BUN 38*   CREATININE 1.3*   CALCIUM 8.9     No results for input(s): AST, ALT, BILIDIR, BILITOT, ALKPHOS in the last 72 hours. No results for input(s): INR in the last 72 hours. No results for input(s): Demetrius Castillo in the last 72 hours. Imaging:  XR CHEST PORTABLE   Final Result   tortuous ectatic aorta   Airspace disease,  possibly atelectasis, pneumonia, or possibly scar   at both lung bases   There is a poor inspiratory effort   The chest does not appear to be significantly changed in the interval               XR CHEST PORTABLE   Final Result   tortuous ectatic aorta   Airspace disease compatible with scar, chronic infiltrate could give   this appearance, unchanged from previous                     XR CHEST PORTABLE   Final Result   No interval change               CTA PULMONARY W CONTRAST   Final Result      1. No pulmonary embolism. 2. Small consolidative opacities in the right lower lobe and left   lower lobe, larger in the latter, which have developed in the interim   since the prior study from 5/23/2015 and are likely   infectious/inflammatory. 3. New 6 mm nodular density in the left upper lobe abutting the   fissure. Follow-up CT of the chest recommended in 6-12 months. 4. Stable prominent scarring along the lung bases. XR CHEST PORTABLE   Final Result   Progressive interstitial density in both lower lungs suggest a   spectrum of fibrosis or scarring without consolidation, effusion, or   CHF. Assessment/Plan:  Active Hospital Problems    Diagnosis Date Noted    Pneumonia [J18.9] 04/21/2017     Acute hypoxic respiratory failure  Pneumonia  ILD  Obesity  COPD  Diabetes mellitus    Plan:  · Continue empiric antibiotics, cultures negative so far, pulmonary following  · Continue O2, slowly titrate down, on BiPAP nightly  · Leukocytosis improving  · Titrate steroids down per pulmonary  · Blood sugars elevated due to being on steroids, continue with insulin and sliding scale coverage     · Body mass index is 28.87 kg/m². · Diet  · DIET CARB CONTROL;    · Code Status  · Full Code       Electronically signed by Jenniffer Saenz MD on 1/15/2020 at 8:30 AM  [employer]   Please contact me through perfect serve    NOTE: This report was transcribed using voice recognition software. Every effort was made to ensure accuracy; however, inadvertent computerized transcription errors may be present.

## 2020-01-15 NOTE — PROGRESS NOTES
[facility]                  [address] · [address] [address] · [place]    [phone]   · Fax  [phone] ·    Romain Dominguez M.D., Akhil Kovacs D.O., F.A.C.O.I., Laci Mccray M.D.,   Armani Rashid M.D., Minesh Contreras M.D. Angela Moreno D.O. Pulmonary Progress Note    CC:  Pneumonia     Subjective:  No acute events overnight   Feels SOB has improved. Still on supplemental O2 3 L  and wore BIPAP overnight. His saturations are 99% this a.m. I did speak with nurse regarding trying to wean his O2. He will need ambulating pulse oximetry before his discharge. Sitting up in bed he appears no acute distress and he has no labored breathing. Denies shortness of breath, admits to nonproductive cough. I will add incentive spirometry and Pep flutter valve. Family at bedside answered all questions. Intake/Output Summary (Last 24 hours) at 1/15/2020 0957  Last data filed at 1/15/2020 0904  Gross per 24 hour   Intake 130 ml   Output 600 ml   Net -470 ml       Diet/Nutrition   DIET CARB CONTROL;    Vitals:   BP (!) 140/68   Pulse 62   Temp 96.8 °F (36 °C) (Temporal)   Resp 24   Ht 6' (1.829 m)   Wt 212 lb 14.4 oz (96.6 kg)   SpO2 98%   BMI 28.87 kg/m²  on     I/O   I/O (24 Hours)    No data found. Intake/Output Summary (Last 24 hours) at 1/15/2020 0957  Last data filed at 1/15/2020 0904  Gross per 24 hour   Intake 130 ml   Output 600 ml   Net -470 ml     I/O last 3 completed shifts: In: 360 [P.O.:360]  Out: 600 [Urine:600]   Date 01/15/20 0000 - 01/15/20 2359   Shift 0453-4169 2304-9874 7951-5299 24 Hour Total   INTAKE   P.O. 0   0   I. V.(mL/kg/hr) 0(0) 10  10   Shift Total(mL/kg) 0(0) 10(0.1)  10(0.1)   OUTPUT   Urine(mL/kg/hr) 350(0.5)   350   Shift Total(mL/kg) 350(3.6)   350(3.6)   Weight (kg) 96.6 96.6 96.6 96.6     No data found.       PHYSICAL EXAMINATION:  General: The patient is lying in bed comfortably 72 hours. UA:No results for input(s): NITRITE, COLORU, PHUR, LABCAST, WBCUA, RBCUA, MUCUS, TRICHOMONAS, YEAST, BACTERIA, CLARITYU, SPECGRAV, LEUKOCYTESUR, UROBILINOGEN, BILIRUBINUR, BLOODU, GLUCOSEU, AMORPHOUS in the last 72 hours. Invalid input(s): KETONESU  No results for input(s): PHART, FFZ0MOR, PO2ART in the last 72 hours. ABG   Lab Results   Component Value Date    PH 7.370 01/10/2020    PCO2 38.9 01/10/2020    PO2 78.2 01/10/2020    HCO3 22.0 01/10/2020    O2SAT 95.4 01/10/2020     Lab Results   Component Value Date    MODE NC- 2L 01/10/2020         CXR 1/13/20      The heart is unremarkable. The lung fields demonstrate evidence for airspace disease. The aorta is tortuous and ectatic.           Impression   tortuous ectatic aorta   Airspace disease,  possibly atelectasis, pneumonia, or possibly scar   at both lung bases   There is a poor inspiratory effort   The chest does not appear to be significantly changed in the interval           Assessment:     1. Acute hypoxic respiratory failure  2. ILD   3. Pneumonia - CAP, procal remains elevated  4. Mild intermittent asthma   5. Asbestosis exposure   6. Obesity      Plan:  1. Solumedrol to 40 IV Q 12 hrs , taper with improvement   2. Bronchodilators -albuterol Q 4, Budesonide and perforomist bid  3. nippv as tolerated - using at HS and PRN daytime   4. Repeat Cxr   5. abx-rocephin 1/11/20 and doxy 1/10/20  for CAP coverage   6. Wean o2 as tolerated -3 liters will need oxygen testing prior to dc  7. DVT, GI prophylaxis  8. PT, OT eval  9. Will add IS and pep/flutter    Electronically signed by BETSEY Liao on 1/15/2020 at 9:57 AM      I personally saw, examined, and cared for the patient. Labs, medications, radiographs reviewed. I agree with history exam and plans detailed in NP note.     Acute resp failure  PNA  ILD  Asthma    Slowly improving  -continue IV abx  -wean Fio2 as michela, may need home O2  -Continue IV steroids 40 g q12

## 2020-01-15 NOTE — CARE COORDINATION
Plan at discharge remains Home with no anticipated needs. Will need ambulation testing prior to discharge. If home O2 is needed, choiced for [facility] if needed. Niece will provide transportation home.  CM will continue to follow

## 2020-01-16 LAB
ANION GAP SERPL CALCULATED.3IONS-SCNC: 11 MMOL/L (ref 7–16)
BUN BLDV-MCNC: 30 MG/DL (ref 8–23)
CALCIUM SERPL-MCNC: 8.3 MG/DL (ref 8.6–10.2)
CHLORIDE BLD-SCNC: 95 MMOL/L (ref 98–107)
CO2: 26 MMOL/L (ref 22–29)
CREAT SERPL-MCNC: 1.2 MG/DL (ref 0.7–1.2)
GFR AFRICAN AMERICAN: >60
GFR NON-AFRICAN AMERICAN: 57 ML/MIN/1.73
GLUCOSE BLD-MCNC: 241 MG/DL (ref 74–99)
HCT VFR BLD CALC: 46.8 % (ref 37–54)
HEMOGLOBIN: 14.5 G/DL (ref 12.5–16.5)
MCH RBC QN AUTO: 29.1 PG (ref 26–35)
MCHC RBC AUTO-ENTMCNC: 31 % (ref 32–34.5)
MCV RBC AUTO: 93.8 FL (ref 80–99.9)
METER GLUCOSE: 195 MG/DL (ref 74–99)
METER GLUCOSE: 251 MG/DL (ref 74–99)
METER GLUCOSE: 254 MG/DL (ref 74–99)
METER GLUCOSE: 372 MG/DL (ref 74–99)
PDW BLD-RTO: 13.1 FL (ref 11.5–15)
PLATELET # BLD: 260 E9/L (ref 130–450)
PMV BLD AUTO: 10.2 FL (ref 7–12)
POTASSIUM SERPL-SCNC: 4.6 MMOL/L (ref 3.5–5)
RBC # BLD: 4.99 E12/L (ref 3.8–5.8)
SODIUM BLD-SCNC: 132 MMOL/L (ref 132–146)
WBC # BLD: 15.7 E9/L (ref 4.5–11.5)

## 2020-01-16 PROCEDURE — 36415 COLL VENOUS BLD VENIPUNCTURE: CPT

## 2020-01-16 PROCEDURE — 80048 BASIC METABOLIC PNL TOTAL CA: CPT

## 2020-01-16 PROCEDURE — 94640 AIRWAY INHALATION TREATMENT: CPT

## 2020-01-16 PROCEDURE — 97112 NEUROMUSCULAR REEDUCATION: CPT

## 2020-01-16 PROCEDURE — 94669 MECHANICAL CHEST WALL OSCILL: CPT

## 2020-01-16 PROCEDURE — 6370000000 HC RX 637 (ALT 250 FOR IP): Performed by: INTERNAL MEDICINE

## 2020-01-16 PROCEDURE — 94660 CPAP INITIATION&MGMT: CPT

## 2020-01-16 PROCEDURE — 2060000000 HC ICU INTERMEDIATE R&B

## 2020-01-16 PROCEDURE — 2700000000 HC OXYGEN THERAPY PER DAY

## 2020-01-16 PROCEDURE — 85027 COMPLETE CBC AUTOMATED: CPT

## 2020-01-16 PROCEDURE — 6360000002 HC RX W HCPCS: Performed by: INTERNAL MEDICINE

## 2020-01-16 PROCEDURE — 82962 GLUCOSE BLOOD TEST: CPT

## 2020-01-16 PROCEDURE — 6360000002 HC RX W HCPCS: Performed by: NURSE PRACTITIONER

## 2020-01-16 PROCEDURE — 2580000003 HC RX 258: Performed by: INTERNAL MEDICINE

## 2020-01-16 PROCEDURE — 97535 SELF CARE MNGMENT TRAINING: CPT

## 2020-01-16 RX ADMIN — INSULIN GLARGINE 25 UNITS: 100 INJECTION, SOLUTION SUBCUTANEOUS at 21:04

## 2020-01-16 RX ADMIN — BUDESONIDE 500 MCG: 0.5 SUSPENSION RESPIRATORY (INHALATION) at 09:20

## 2020-01-16 RX ADMIN — INSULIN LISPRO 6 UNITS: 100 INJECTION, SOLUTION INTRAVENOUS; SUBCUTANEOUS at 10:05

## 2020-01-16 RX ADMIN — ACETAMINOPHEN 650 MG: 325 TABLET, FILM COATED ORAL at 11:39

## 2020-01-16 RX ADMIN — ASPIRIN 81 MG 81 MG: 81 TABLET ORAL at 10:05

## 2020-01-16 RX ADMIN — BUDESONIDE 500 MCG: 0.5 SUSPENSION RESPIRATORY (INHALATION) at 21:10

## 2020-01-16 RX ADMIN — ALBUTEROL SULFATE 2.5 MG: 2.5 SOLUTION RESPIRATORY (INHALATION) at 04:01

## 2020-01-16 RX ADMIN — INSULIN LISPRO 5 UNITS: 100 INJECTION, SOLUTION INTRAVENOUS; SUBCUTANEOUS at 21:04

## 2020-01-16 RX ADMIN — SODIUM CHLORIDE, PRESERVATIVE FREE 10 ML: 5 INJECTION INTRAVENOUS at 23:30

## 2020-01-16 RX ADMIN — FORMOTEROL FUMARATE DIHYDRATE 20 MCG: 20 SOLUTION RESPIRATORY (INHALATION) at 09:20

## 2020-01-16 RX ADMIN — SODIUM CHLORIDE, PRESERVATIVE FREE 10 ML: 5 INJECTION INTRAVENOUS at 21:04

## 2020-01-16 RX ADMIN — DOXYCYCLINE HYCLATE 100 MG: 100 CAPSULE ORAL at 10:05

## 2020-01-16 RX ADMIN — FINASTERIDE 5 MG: 5 TABLET, FILM COATED ORAL at 10:06

## 2020-01-16 RX ADMIN — ALBUTEROL SULFATE 2.5 MG: 2.5 SOLUTION RESPIRATORY (INHALATION) at 21:09

## 2020-01-16 RX ADMIN — ACETAMINOPHEN 650 MG: 325 TABLET, FILM COATED ORAL at 21:08

## 2020-01-16 RX ADMIN — ALBUTEROL SULFATE 2.5 MG: 2.5 SOLUTION RESPIRATORY (INHALATION) at 16:05

## 2020-01-16 RX ADMIN — SODIUM CHLORIDE, PRESERVATIVE FREE 10 ML: 5 INJECTION INTRAVENOUS at 13:57

## 2020-01-16 RX ADMIN — CEFTRIAXONE SODIUM 1 G: 1 INJECTION, POWDER, FOR SOLUTION INTRAMUSCULAR; INTRAVENOUS at 13:57

## 2020-01-16 RX ADMIN — SODIUM CHLORIDE, PRESERVATIVE FREE 10 ML: 5 INJECTION INTRAVENOUS at 00:12

## 2020-01-16 RX ADMIN — METHYLPREDNISOLONE SODIUM SUCCINATE 40 MG: 40 INJECTION, POWDER, FOR SOLUTION INTRAMUSCULAR; INTRAVENOUS at 23:30

## 2020-01-16 RX ADMIN — ALBUTEROL SULFATE 2.5 MG: 2.5 SOLUTION RESPIRATORY (INHALATION) at 12:08

## 2020-01-16 RX ADMIN — INSULIN LISPRO 6 UNITS: 100 INJECTION, SOLUTION INTRAVENOUS; SUBCUTANEOUS at 11:40

## 2020-01-16 RX ADMIN — METHYLPREDNISOLONE SODIUM SUCCINATE 40 MG: 40 INJECTION, POWDER, FOR SOLUTION INTRAMUSCULAR; INTRAVENOUS at 00:12

## 2020-01-16 RX ADMIN — ALBUTEROL SULFATE 2.5 MG: 2.5 SOLUTION RESPIRATORY (INHALATION) at 09:20

## 2020-01-16 RX ADMIN — INSULIN LISPRO 2 UNITS: 100 INJECTION, SOLUTION INTRAVENOUS; SUBCUTANEOUS at 19:01

## 2020-01-16 RX ADMIN — SODIUM CHLORIDE, PRESERVATIVE FREE 10 ML: 5 INJECTION INTRAVENOUS at 10:06

## 2020-01-16 RX ADMIN — FORMOTEROL FUMARATE DIHYDRATE 20 MCG: 20 SOLUTION RESPIRATORY (INHALATION) at 21:10

## 2020-01-16 RX ADMIN — ROSUVASTATIN CALCIUM 10 MG: 10 TABLET, FILM COATED ORAL at 10:05

## 2020-01-16 RX ADMIN — ENOXAPARIN SODIUM 40 MG: 40 INJECTION SUBCUTANEOUS at 10:06

## 2020-01-16 RX ADMIN — METHYLPREDNISOLONE SODIUM SUCCINATE 40 MG: 40 INJECTION, POWDER, FOR SOLUTION INTRAMUSCULAR; INTRAVENOUS at 13:56

## 2020-01-16 RX ADMIN — DOXYCYCLINE HYCLATE 100 MG: 100 CAPSULE ORAL at 21:03

## 2020-01-16 ASSESSMENT — PAIN DESCRIPTION - ONSET: ONSET: GRADUAL

## 2020-01-16 ASSESSMENT — PAIN SCALES - GENERAL
PAINLEVEL_OUTOF10: 0
PAINLEVEL_OUTOF10: 0
PAINLEVEL_OUTOF10: 3
PAINLEVEL_OUTOF10: 0
PAINLEVEL_OUTOF10: 5
PAINLEVEL_OUTOF10: 0

## 2020-01-16 ASSESSMENT — PAIN DESCRIPTION - PAIN TYPE
TYPE: CHRONIC PAIN
TYPE: CHRONIC PAIN

## 2020-01-16 ASSESSMENT — PAIN DESCRIPTION - LOCATION
LOCATION: KNEE
LOCATION: GENERALIZED

## 2020-01-16 ASSESSMENT — PAIN DESCRIPTION - DESCRIPTORS: DESCRIPTORS: ACHING;DISCOMFORT

## 2020-01-16 ASSESSMENT — PAIN DESCRIPTION - FREQUENCY: FREQUENCY: INTERMITTENT

## 2020-01-16 NOTE — PROGRESS NOTES
Date: 1/16/2020    Time: 2:14 AM    Patient Placed On BIPAP/CPAP/ Non-Invasive Ventilation? Patient was placed on bipap during previous shift. Patient was on bipap prior to this RT entering patient's room. If no must comment. Facial area red/color change? No           If YES are Blister/Lesion present? No   If yes must notify nursing staff  BIPAP/CPAP skin barrier?   Yes    Skin barrier type:mepilex       Comments:        Jennifer Lockett

## 2020-01-16 NOTE — PROGRESS NOTES
Occupational Therapy  OT BEDSIDE TREATMENT NOTE      Date:2020  Patient Name: Arias Mojica  MRN: [id number]  : 1934  Room: [address] [place] [address]     Evaluating OT:  MARTY Nova, OTR/L #[id number]     AM-PAC Daily Activity Raw Score:  15/24  Recommended Adaptive Equipment:  TBD as pt progresses  - Adaptive equipment for LB dressing/Item Retrieval     Reason for Admission:  Pt was admitted w/ SOB, cough, weakness     Diagnosis:  COPD, PNA, Dyspnea      Procedures this admission:  None      Pertinent Medical History:  RA, COPD, DM, [place]      Precautions:  Falls  Very [place] - No Hearing aids - Speak Loudly into Right Ear     Home Living: Pt lives with his Bia Ananth Niece and Great-Great Nephew in a 1-story hosue with 3 DIAMOND and Hunter HR/s. Bed/bath on the main floor. Laundry in the basement - Flight w/ Hunter HR   Bathroom setup:  Tub-Shower, High Commode w/ HRs   Equipment owned:  [facility], [address], Shower Chair - does not use     Available Family Assist:  Niece, Geovanny Lias, provides  assist      Prior Level of Function:  Mostly IND with ADLs, IADLs, Transfers and Mobility using No AD for ambulation. Occasionally goes down to the basement to do laundry, makes own meals  Driving:  No  Occupation:  None reported at this time     Pain Level:  No c/o pain at this time. Additional Complaints:  None verbalized     Vitals/Lab Values:  O2 sats 97% at rest seated on the EOB, decreased to 90% briefly w/ ambulating, stayed in high 90s% after ax seated in chair - w room air.     Cognition: A & O x 4 - Able to state name, place, current Month, and year          Able to Follow Simple Commands Min-Mod VCs/Visual Cues and Tactile Cues d/t Severity of his Loss of hearing              Memory:  fair               Sequencing:  fair               Problem solving:  fair               Judgement/safety:  fair   Additional Comments: Pt showing increased ability to participate.                  Functional Assessment:    Initial Eval Status  Date: 1-14-20 Treatment Status  Date: 1/16/20 Short Term Goals  Treatment frequency: PRN 2-4 x/week  1 week   Feeding SUP/Set up     Assist to open containers on tray, able to feed self  Setup; To complete bringing drink to mouth.        Grooming Close SUP/Set up/Mod VCs      Able to stand at the sink w/ Close SUP for safety w/ standing, Mod VCs/Visual Cues to wash hands/comb hair  Setup; To wash face and comb hair while sitting up on the EOB. SUP  Standing At The Sink   UB Dressing Min A/Mod VCs     Required Min A for safety w/ standing balance, Min A to don bathrobe in standing, Mod VCs to problem solve task  Min A;    To salbador/doff gown    SUP   LB Dressing Max A/Mod VCs     Required Max A to don/doff socks while sitting in chair despite pt ed for adaptive tech, Min A for standing balance + Mod A to pull garments over hips  Max A; To salbador pants while sitting up on the EOB. Mod A   Bathing NT       N/T    Mod A   Toileting NT       Max A to improve hygiene. Min A   Bed Mobility  Rolling:  NT  Repositioning:  NT   Supine to Sit:  NT    Sit to Supine:  NT      Pt seated in chair at start of session   Rolling:  SBA  Repositioning:  SBA   Supine to Sit:  SBA  Sit to Supine:  SBA    Min verbal prompting transfer to and from supine with increased time. SUP   Functional Transfers Sit to stand:  Min A  Stand to sit:  Min A       Required Min A + Mod VCs for safety to stand from Armed Chair 2x  Pt ed re: safety/hand placement Sit to stand:  Min A;    Min verbal for proper hand placement while using w/w.     SUP   Functional Mobility Min A/Mod VCs w/ FWW     Does not typically use FWW - required Mod VCs for use of walker, Walker safety, Min A for balance - short distance in room ~ 6' 2x  Min A; To maximize safety while ambulating through hallway with verbal prompts to stay within w/w.     SUP   Balance Sitting:  SUP     Static:  SUP    Dynamic:  SUP - in chair w/ functional ax     Standing:  Min A + use of

## 2020-01-16 NOTE — PROGRESS NOTES
[facility]                  [address] · [address] [address] · [place]    [phone]   · Fax  [phone] ·    Frandy Perez M.D., Emily Toussaint D.O., F.A.C.O.I., Chava Maloney M.D.,   Nadia Myers M.D., Sammy Jose M.D. Melinda Gorve D.O. Pulmonary Progress Note    CC:  Pneumonia     Subjective:  No acute events overnight   Denies shortness of breath today. Still on supplemental O2 2 L  and wore BIPAP overnight. His saturations are 97% this a.m. He will need ambulating pulse oximetry before his discharge. Sitting up in bed he appears no acute distress and he has no labored breathing. Denies to nonproductive cough. Family at bedside answered all questions. Intake/Output Summary (Last 24 hours) at 1/16/2020 1141  Last data filed at 1/16/2020 1005  Gross per 24 hour   Intake 1050 ml   Output 850 ml   Net 200 ml       Diet/Nutrition   DIET CARB CONTROL;    Vitals:   /68   Pulse 94   Temp 98 °F (36.7 °C) (Temporal)   Resp 22   Ht 6' (1.829 m)   Wt 212 lb 14.4 oz (96.6 kg)   SpO2 97%   BMI 28.87 kg/m²  on     I/O   I/O (24 Hours)    Patient Vitals for the past 8 hrs:   BP Temp Temp src Pulse Resp SpO2   01/16/20 0815 137/68 98 °F (36.7 °C) Temporal 94 22 97 %   01/16/20 0629 -- -- -- -- -- 99 %   01/16/20 0401 -- -- -- -- -- 96 %       Intake/Output Summary (Last 24 hours) at 1/16/2020 1141  Last data filed at 1/16/2020 1005  Gross per 24 hour   Intake 1050 ml   Output 850 ml   Net 200 ml     I/O last 3 completed shifts: In: 18 [P.O.:960; I.V.:30]  Out: 850 [Urine:850]   Date 01/16/20 0000 - 01/16/20 2359   Shift 8169-4980 7872-0685 6679-2096 24 Hour Total   INTAKE   P.O. 120 240  360   I. V.(mL/kg/hr)  10  10   Shift Total(mL/kg) 120(1.2) 250(2.6)  370(3.8)   OUTPUT   Urine(mL/kg/hr) 500(0.6)   500   Emesis/NG output 0   0   Other 0   0   Stool 0   0   Blood 0   0   Shift Total(mL/kg) 500(5.2)

## 2020-01-16 NOTE — PROGRESS NOTES
Date: 1/15/2020    Time: 10:20 PM    Patient Placed On BIPAP/CPAP/ Non-Invasive Ventilation? No  Rn had placed pt on bipap ealier    If no must comment. Facial area red/color change? No           If YES are Blister/Lesion present? No   If yes must notify nursing staff  BIPAP/CPAP skin barrier?   Yes    Skin barrier type:mepilex       Comments:        Vijaya Majano

## 2020-01-16 NOTE — PROGRESS NOTES
Applied patient's bipap at 2020 on 01/15/2020. Patient kept on throughout the night without issue. Bipap removed at 0630 this morning - 01/16/2020.

## 2020-01-16 NOTE — PROGRESS NOTES
Hospitalist Progress Note      PCP: Sid Larios MD    Date of Admission: 1/10/2020  Days in the hospital: [address] Course:   Patient is a 25-year-old who comes into hospital with complaints of cough and shortness of breath. He does have a history of COPD and follows up with pulmonary. Patient was noted to have multifocal pneumonia and he was admitted to hospital for further evaluation. He was placed on empiric antibiotics and consultation was placed to pulmonary. He was initially on BiPAP. Currently he is being weaned off oxygen. He also has history of ILD. Pulmonary following       Subjective  Patient seen and examined at bedside. Patient denies any fevers, chills, chest pain, shortness of breath, nausea, vomiting.   Chart reviewed, night events reviewed      Medications:  Reviewed    Infusion Medications    dextrose      sodium chloride 75 mL/hr at 01/11/20 0618     Scheduled Medications    insulin glargine  25 Units Subcutaneous Nightly    methylPREDNISolone  40 mg Intravenous Q12H    formoterol  20 mcg Nebulization BID    budesonide  0.5 mg Nebulization BID    albuterol  2.5 mg Nebulization Q4H    aspirin  81 mg Oral Daily    finasteride  5 mg Oral Daily    furosemide  20 mg Oral Once per day on Mon Wed Fri    rosuvastatin  10 mg Oral Daily    sodium chloride flush  10 mL Intravenous 2 times per day    enoxaparin  40 mg Subcutaneous Daily    cefTRIAXone (ROCEPHIN) IV  1 g Intravenous Q24H    doxycycline hyclate  100 mg Oral 2 times per day    insulin lispro  0-12 Units Subcutaneous TID WC    insulin lispro  0-6 Units Subcutaneous Nightly     PRN Meds: ipratropium-albuterol, acetaminophen, sodium chloride flush, magnesium hydroxide, ondansetron, glucose, dextrose, glucagon (rDNA), dextrose      Intake/Output Summary (Last 24 hours) at 1/16/2020 0736  Last data filed at 1/16/2020 8733  Gross per 24 hour   Intake 990 ml   Output 850 ml   Net 140 ml       Exam:    BP (!) CT of the chest recommended in 6-12 months. 4. Stable prominent scarring along the lung bases. XR CHEST PORTABLE   Final Result   Progressive interstitial density in both lower lungs suggest a   spectrum of fibrosis or scarring without consolidation, effusion, or   CHF. Assessment/Plan:  Active Hospital Problems    Diagnosis Date Noted    Pneumonia [J18.9] 04/21/2017     Acute hypoxic respiratory failure  Pneumonia  ILD  Obesity  COPD  Diabetes mellitus    Plan:  · Continue empiric IV antibiotics, cultures negative so far, pulmonary following  · Continue O2, slowly titrate down, on BiPAP nightly  · Leukocytosis improving  · Titrate steroids down per pulmonary  · Blood sugars elevated due to being on steroids, continue with insulin and sliding scale coverage   · Possible dc in next 24 - 48 hours depending on clinical progress     Body mass index is 28.87 kg/m². · Diet  DIET CARB CONTROL;    · Code Status  Full Code       Electronically signed by Tim Syed MD on 1/16/2020 at 7:36 AM  [employer]   Please contact me through perfect [employer]    NOTE: This report was transcribed using voice recognition software. Every effort was made to ensure accuracy; however, inadvertent computerized transcription errors may be present.

## 2020-01-17 VITALS
DIASTOLIC BLOOD PRESSURE: 68 MMHG | TEMPERATURE: 97.6 F | OXYGEN SATURATION: 98 % | WEIGHT: 212 LBS | HEIGHT: 72 IN | RESPIRATION RATE: 24 BRPM | SYSTOLIC BLOOD PRESSURE: 142 MMHG | BODY MASS INDEX: 28.71 KG/M2 | HEART RATE: 78 BPM

## 2020-01-17 LAB
METER GLUCOSE: 221 MG/DL (ref 74–99)
METER GLUCOSE: 250 MG/DL (ref 74–99)
METER GLUCOSE: 271 MG/DL (ref 74–99)

## 2020-01-17 PROCEDURE — 94660 CPAP INITIATION&MGMT: CPT

## 2020-01-17 PROCEDURE — 82962 GLUCOSE BLOOD TEST: CPT

## 2020-01-17 PROCEDURE — 94640 AIRWAY INHALATION TREATMENT: CPT

## 2020-01-17 PROCEDURE — 2580000003 HC RX 258: Performed by: INTERNAL MEDICINE

## 2020-01-17 PROCEDURE — 6370000000 HC RX 637 (ALT 250 FOR IP): Performed by: CLINICAL NURSE SPECIALIST

## 2020-01-17 PROCEDURE — 6370000000 HC RX 637 (ALT 250 FOR IP): Performed by: INTERNAL MEDICINE

## 2020-01-17 PROCEDURE — 6360000002 HC RX W HCPCS: Performed by: INTERNAL MEDICINE

## 2020-01-17 PROCEDURE — 2700000000 HC OXYGEN THERAPY PER DAY

## 2020-01-17 PROCEDURE — 94667 MNPJ CHEST WALL 1ST: CPT

## 2020-01-17 RX ORDER — PREDNISONE 20 MG/1
40 TABLET ORAL DAILY
Status: DISCONTINUED | OUTPATIENT
Start: 2020-01-17 | End: 2020-01-17 | Stop reason: HOSPADM

## 2020-01-17 RX ORDER — PANTOPRAZOLE SODIUM 20 MG/1
20 TABLET, DELAYED RELEASE ORAL
Status: DISCONTINUED | OUTPATIENT
Start: 2020-01-17 | End: 2020-01-17 | Stop reason: HOSPADM

## 2020-01-17 RX ORDER — DOXYCYCLINE HYCLATE 100 MG/1
100 CAPSULE ORAL 2 TIMES DAILY
Qty: 6 CAPSULE | Refills: 0 | Status: SHIPPED | OUTPATIENT
Start: 2020-01-17 | End: 2020-01-20

## 2020-01-17 RX ORDER — PREDNISONE 10 MG/1
TABLET ORAL
Qty: 30 TABLET | Refills: 0 | Status: SHIPPED | OUTPATIENT
Start: 2020-01-17 | End: 2020-06-26 | Stop reason: ALTCHOICE

## 2020-01-17 RX ADMIN — PANTOPRAZOLE SODIUM 20 MG: 20 TABLET, DELAYED RELEASE ORAL at 13:47

## 2020-01-17 RX ADMIN — INSULIN LISPRO 6 UNITS: 100 INJECTION, SOLUTION INTRAVENOUS; SUBCUTANEOUS at 09:01

## 2020-01-17 RX ADMIN — FUROSEMIDE 20 MG: 20 TABLET ORAL at 09:00

## 2020-01-17 RX ADMIN — FORMOTEROL FUMARATE DIHYDRATE 20 MCG: 20 SOLUTION RESPIRATORY (INHALATION) at 10:44

## 2020-01-17 RX ADMIN — FINASTERIDE 5 MG: 5 TABLET, FILM COATED ORAL at 09:00

## 2020-01-17 RX ADMIN — PREDNISONE 40 MG: 20 TABLET ORAL at 13:47

## 2020-01-17 RX ADMIN — ALBUTEROL SULFATE 2.5 MG: 2.5 SOLUTION RESPIRATORY (INHALATION) at 01:28

## 2020-01-17 RX ADMIN — INSULIN LISPRO 4 UNITS: 100 INJECTION, SOLUTION INTRAVENOUS; SUBCUTANEOUS at 11:41

## 2020-01-17 RX ADMIN — BUDESONIDE 500 MCG: 0.5 SUSPENSION RESPIRATORY (INHALATION) at 10:44

## 2020-01-17 RX ADMIN — SODIUM CHLORIDE, PRESERVATIVE FREE 10 ML: 5 INJECTION INTRAVENOUS at 09:00

## 2020-01-17 RX ADMIN — ROSUVASTATIN CALCIUM 10 MG: 10 TABLET, FILM COATED ORAL at 08:59

## 2020-01-17 RX ADMIN — ENOXAPARIN SODIUM 40 MG: 40 INJECTION SUBCUTANEOUS at 09:00

## 2020-01-17 RX ADMIN — ALBUTEROL SULFATE 2.5 MG: 2.5 SOLUTION RESPIRATORY (INHALATION) at 10:43

## 2020-01-17 RX ADMIN — DOXYCYCLINE HYCLATE 100 MG: 100 CAPSULE ORAL at 09:00

## 2020-01-17 RX ADMIN — ASPIRIN 81 MG 81 MG: 81 TABLET ORAL at 09:00

## 2020-01-17 RX ADMIN — ALBUTEROL SULFATE 2.5 MG: 2.5 SOLUTION RESPIRATORY (INHALATION) at 05:00

## 2020-01-17 ASSESSMENT — PAIN SCALES - GENERAL
PAINLEVEL_OUTOF10: 0
PAINLEVEL_OUTOF10: 0

## 2020-01-17 NOTE — PROGRESS NOTES
01/16/20 2133   NIV Type   Skin Assessment Clean, dry, & intact   Skin Protection for O2 Device Yes   Equipment Type v60   Mode Bilevel   Mask Type Full face mask   Mask Size Medium   Bonnet size Medium   Settings/Measurements   IPAP 12 cmH20   CPAP/EPAP 6 cmH2O   Resp 19   FiO2  30 %   I Time/ I Time % 1 s   Vt Exhaled 495 mL   Minute Volume 10 Liters   Mask Leak (lpm) 44 lpm   Comfort Level Good   Using Accessory Muscles No   Date: 1/16/2020    Time: 9:34 PM    Patient Placed On BIPAP/CPAP/ Non-Invasive Ventilation? Yes    If no must comment. Facial area red/color change? No           If YES are Blister/Lesion present? No   If yes must notify nursing staff  BIPAP/CPAP skin barrier?   Yes    Skin barrier type:mepilex       Comments:12/6        Dereje Gonzales

## 2020-01-17 NOTE — PROGRESS NOTES
[facility]                  [address] · [address] [address] · [place]    [phone]   · Fax  [phone] ·    Nichol Sargent M.D., Bernie Jang D.O., F.A.C.O.I., Benitez Segal M.D.,   Mae Lyles M.D., Anthon Pallas, M.D. Abram Lyn D.O. Pulmonary Progress Note    CC:  Pneumonia     Subjective:  No acute events overnight   Denies shortness of breath today. Still on supplemental O2 2 L  and wore BIPAP overnight. His saturations are 97% this a.m. now on RA . Sounds better . Intake/Output Summary (Last 24 hours) at 1/17/2020 1136  Last data filed at 1/17/2020 0932  Gross per 24 hour   Intake 870 ml   Output 1075 ml   Net -205 ml       Diet/Nutrition   DIET CARB CONTROL;    Vitals:   BP (!) 142/68   Pulse 74   Temp 97.6 °F (36.4 °C) (Temporal)   Resp 28   Ht 6' (1.829 m)   Wt 212 lb 14.4 oz (96.6 kg)   SpO2 98%   BMI 28.87 kg/m²  on     I/O   I/O (24 Hours)    Patient Vitals for the past 8 hrs:   BP Temp Temp src Pulse Resp SpO2   01/17/20 1052 -- -- -- -- 28 98 %   01/17/20 1049 -- -- -- -- 28 98 %   01/17/20 1045 -- -- -- -- 28 98 %   01/17/20 0800 (!) 142/68 97.6 °F (36.4 °C) Temporal 74 16 97 %       Intake/Output Summary (Last 24 hours) at 1/17/2020 1136  Last data filed at 1/17/2020 0932  Gross per 24 hour   Intake 870 ml   Output 1075 ml   Net -205 ml     I/O last 3 completed shifts: In: 36 [P.O.:720; I.V.:40]  Out: 1075 [Urine:1075]   Date 01/17/20 0000 - 01/17/20 2359   Shift 6752-7126 0765-0060 4236-4981 24 Hour Total   INTAKE   P.O.  360  360   Shift Total(mL/kg)  360(3.7)  360(3.7)   OUTPUT   Urine(mL/kg/hr) 450(0.6)   450   Emesis/NG output 0   0   Other 0   0   Stool 0   0   Blood 0   0   Shift Total(mL/kg) 450(4.7)   450(4.7)   Weight (kg) 96.6 96.6 96.6 96.6     No data found. PHYSICAL EXAMINATION:  General: The patient is lying in bed comfortably without any distress.   Breathing is not labored  HEENT: Pupils are equal round and reactive to light, there are no oral lesions and no post-nasal drip   Neck: supple without adenopathy  Cardiovascular: regular rate and rhythm without murmur or gallop  Respiratory: Diminished breath sounds bilaterally Air entry is symmetric  Abdomen: soft, non-tender, non-distended, normal bowel sounds  Extremities: warm, no edema, no clubbing  Skin: no rash or lesion  Neurologic: CN II-XII grossly intact, no focal deficits    Medications:    Scheduled Meds:   insulin glargine  25 Units Subcutaneous Nightly    methylPREDNISolone  40 mg Intravenous Q12H    formoterol  20 mcg Nebulization BID    budesonide  0.5 mg Nebulization BID    albuterol  2.5 mg Nebulization Q4H    aspirin  81 mg Oral Daily    finasteride  5 mg Oral Daily    furosemide  20 mg Oral Once per day on Mon Wed Fri    rosuvastatin  10 mg Oral Daily    sodium chloride flush  10 mL Intravenous 2 times per day    enoxaparin  40 mg Subcutaneous Daily    cefTRIAXone (ROCEPHIN) IV  1 g Intravenous Q24H    doxycycline hyclate  100 mg Oral 2 times per day    insulin lispro  0-12 Units Subcutaneous TID WC    insulin lispro  0-6 Units Subcutaneous Nightly       Continuous Infusions:   dextrose      sodium chloride 75 mL/hr at 01/11/20 0618       PRN Meds:  ipratropium-albuterol, acetaminophen, sodium chloride flush, magnesium hydroxide, ondansetron, glucose, dextrose, glucagon (rDNA), dextrose    Labs:  CBC:   Recent Labs     01/16/20 0419   WBC 15.7*   HGB 14.5   HCT 46.8   MCV 93.8        BMP:   Recent Labs     01/16/20 0419      K 4.6   CL 95*   CO2 26   BUN 30*   CREATININE 1.2     LIVER PROFILE:   No results for input(s): AST, ALT, LIPASE, BILIDIR, BILITOT, ALKPHOS in the last 72 hours. Invalid input(s): AMYLASE,  ALB  PT/INR:   No results for input(s): PROTIME, INR in the last 72 hours. APTT:   No results for input(s): APTT in the last 72 hours.   UA:No results for

## 2020-01-17 NOTE — DISCHARGE SUMMARY
auscultation, good air entry  Cardiovascular: Regular rate and rhythm with normal S1/S2 without murmurs, rubs or gallops. Abdomen: Soft, non-tender, non-distended with normal bowel sounds. Skin:  No rashes    Neurologic: awake, alert and following commands     Labs: For convenience and continuity at follow-up the following most recent labs are provided:      CBC:    Lab Results   Component Value Date    WBC 15.7 01/16/2020    HGB 14.5 01/16/2020    HCT 46.8 01/16/2020     01/16/2020       Renal:    Lab Results   Component Value Date     01/16/2020    K 4.6 01/16/2020    K 4.9 01/14/2020    CL 95 01/16/2020    CO2 26 01/16/2020    BUN 30 01/16/2020    CREATININE 1.2 01/16/2020    CALCIUM 8.3 01/16/2020       Imaging:  XR CHEST PORTABLE   Final Result   tortuous ectatic aorta   Airspace disease,  possibly atelectasis, pneumonia, or possibly scar   at both lung bases   There is a poor inspiratory effort   The chest does not appear to be significantly changed in the interval               XR CHEST PORTABLE   Final Result   tortuous ectatic aorta   Airspace disease compatible with scar, chronic infiltrate could give   this appearance, unchanged from previous                     XR CHEST PORTABLE   Final Result   No interval change               CTA PULMONARY W CONTRAST   Final Result      1. No pulmonary embolism. 2. Small consolidative opacities in the right lower lobe and left   lower lobe, larger in the latter, which have developed in the interim   since the prior study from 5/23/2015 and are likely   infectious/inflammatory. 3. New 6 mm nodular density in the left upper lobe abutting the   fissure. Follow-up CT of the chest recommended in 6-12 months. 4. Stable prominent scarring along the lung bases. XR CHEST PORTABLE   Final Result   Progressive interstitial density in both lower lungs suggest a   spectrum of fibrosis or scarring without consolidation, effusion, or   CHF. Discharge Medications:     Current Discharge Medication List           Details   predniSONE (DELTASONE) 10 MG tablet Take 4 tabs daily x 3 days then 3 tabs daily x 3 days then 2 tabs daily x 3 days then 1 tab daily x 3 days then stop  Qty: 30 tablet, Refills: 0      doxycycline hyclate (VIBRAMYCIN) 100 MG capsule Take 1 capsule by mouth 2 times daily for 3 days  Qty: 6 capsule, Refills: 0              Details   Cholecalciferol 50 MCG (2000 UT) TABS Take 2,000 Units by mouth daily      acetaminophen (TYLENOL) 325 MG tablet Take 650 mg by mouth every 6 hours as needed for Pain      albuterol (PROVENTIL) (2.5 MG/3ML) 0.083% nebulizer solution Take 3 mLs by nebulization every 6 hours  Qty: 120 vial, Refills: 6    Associated Diagnoses: ILD (interstitial lung disease) ([facility] [address] Asbestosis ([facility] [address]      GLIPIZIDE XL 5 MG extended release tablet Take 5 mg by mouth daily       furosemide (LASIX) 20 MG tablet Take 20 mg by mouth three times a week Indications: Takes M, W, F Given Monday ,Wednesday ,Friday      aspirin 81 MG tablet Take 81 mg by mouth daily      rosuvastatin (CRESTOR) 10 MG tablet Take 10 mg by mouth daily      dutasteride (AVODART) 0.5 MG capsule Take 0.5 mg by mouth Twice a Week Given Wednesday, Saturday      Calcium Carbonate Antacid (OLGA-SELTZER ANTACID PO) Take 1 tablet by mouth as needed (heartburn)              Time Spent on discharge is more than 38 min in the examination, evaluation, counseling and review of medications and discharge plan. Signed:    Marcus Estrella MD   1/17/2020      Thank you Lurdes Milton MD for the opportunity to be involved in this patient's care. If you have any questions or concerns please feel free to contact me. NOTE: This report was transcribed using voice recognition software. Every effort was made to ensure accuracy; however, inadvertent computerized transcription errors may be present.

## 2020-01-17 NOTE — PROGRESS NOTES
All discharge paperwork reviewed with patient and his POA, Mer Nava, at bedside. Yuliana Johnson appeared to understand all information, stating \"Adrian has an aerosol machine at home! \"

## 2020-01-17 NOTE — PROGRESS NOTES
Physical Therapy  Facility/Department: [address] PICU  Daily Treatment Note  NAME: Thanh Sheehan  : 1934  MRN: [id number]    Date of Service: 2020    Pt is sitting EOB and being d/c at this time. No needs per pt and pt's wife.     Lauren Ahn PTA [id number]

## 2020-01-17 NOTE — PROGRESS NOTES
01/17/20 0128   NIV Type   $NIV $Daily Charge   Skin Assessment Clean, dry, & intact   Skin Protection for O2 Device Yes   Equipment Type v60   Mode Bilevel   Mask Type Full face mask   Mask Size Medium   Settings/Measurements   IPAP 16 cmH20  (Dr Betsy Motley aware of the changes made )   CPAP/EPAP 6 cmH2O   Resp 24   FiO2  30 %   Vt Exhaled 495 mL   Minute Volume 9 Liters   Mask Leak (lpm) 44 lpm   Comfort Level Good   Using Accessory Muscles No   SpO2 98   Date: 1/17/2020    Time: 1:55 AM    BIPAP RECHECK  Facial area red/color change? No           If YES are Blister/Lesion present? No   If yes must notify nursing staff  BIPAP/CPAP skin barrier?   Yes    Skin barrier type:mepilex       Comments:Dr Sewell aware of the changes made         Sara Vernon

## 2020-01-28 ENCOUNTER — HOSPITAL ENCOUNTER (OUTPATIENT)
Dept: GENERAL RADIOLOGY | Age: 85
Discharge: HOME | End: 2020-01-30
Payer: MEDICARE

## 2020-01-28 ENCOUNTER — HOSPITAL ENCOUNTER (OUTPATIENT)
Age: 85
Discharge: HOME | End: 2020-01-30
Payer: MEDICARE

## 2020-01-28 PROCEDURE — 71046 X-RAY EXAM CHEST 2 VIEWS: CPT

## 2020-02-17 ENCOUNTER — HOSPITAL ENCOUNTER (OUTPATIENT)
Age: 85
Discharge: HOME | End: 2020-02-17
Payer: MEDICARE

## 2020-02-17 LAB
ALBUMIN SERPL-MCNC: 3.3 G/DL (ref 3.5–5.2)
ALP BLD-CCNC: 72 U/L (ref 40–129)
ALT SERPL-CCNC: 22 U/L (ref 0–40)
ANION GAP SERPL CALCULATED.3IONS-SCNC: 11 MMOL/L (ref 7–16)
AST SERPL-CCNC: 14 U/L (ref 0–39)
BASOPHILS ABSOLUTE: 0.05 E9/L (ref 0–0.2)
BASOPHILS RELATIVE PERCENT: 0.5 % (ref 0–2)
BILIRUB SERPL-MCNC: 0.5 MG/DL (ref 0–1.2)
BUN BLDV-MCNC: 17 MG/DL (ref 8–23)
CALCIUM SERPL-MCNC: 9.4 MG/DL (ref 8.6–10.2)
CHLORIDE BLD-SCNC: 100 MMOL/L (ref 98–107)
CHOLESTEROL, TOTAL: 141 MG/DL (ref 0–199)
CO2: 27 MMOL/L (ref 22–29)
CREAT SERPL-MCNC: 1.2 MG/DL (ref 0.7–1.2)
EOSINOPHILS ABSOLUTE: 0.09 E9/L (ref 0.05–0.5)
EOSINOPHILS RELATIVE PERCENT: 0.9 % (ref 0–6)
GFR AFRICAN AMERICAN: >60
GFR NON-AFRICAN AMERICAN: 57 ML/MIN/1.73
GLUCOSE BLD-MCNC: 195 MG/DL (ref 74–99)
HBA1C MFR BLD: 10.3 % (ref 4–5.6)
HCT VFR BLD CALC: 47.1 % (ref 37–54)
HDLC SERPL-MCNC: 48 MG/DL
HEMOGLOBIN: 14.4 G/DL (ref 12.5–16.5)
IMMATURE GRANULOCYTES #: 0.07 E9/L
IMMATURE GRANULOCYTES %: 0.7 % (ref 0–5)
LDL CHOLESTEROL CALCULATED: 60 MG/DL (ref 0–99)
LYMPHOCYTES ABSOLUTE: 1.65 E9/L (ref 1.5–4)
LYMPHOCYTES RELATIVE PERCENT: 16.2 % (ref 20–42)
MCH RBC QN AUTO: 29.4 PG (ref 26–35)
MCHC RBC AUTO-ENTMCNC: 30.6 % (ref 32–34.5)
MCV RBC AUTO: 96.3 FL (ref 80–99.9)
MONOCYTES ABSOLUTE: 0.88 E9/L (ref 0.1–0.95)
MONOCYTES RELATIVE PERCENT: 8.6 % (ref 2–12)
NEUTROPHILS ABSOLUTE: 7.47 E9/L (ref 1.8–7.3)
NEUTROPHILS RELATIVE PERCENT: 73.1 % (ref 43–80)
PDW BLD-RTO: 13.9 FL (ref 11.5–15)
PLATELET # BLD: 243 E9/L (ref 130–450)
PMV BLD AUTO: 9.7 FL (ref 7–12)
POTASSIUM SERPL-SCNC: 4.5 MMOL/L (ref 3.5–5)
RBC # BLD: 4.89 E12/L (ref 3.8–5.8)
SODIUM BLD-SCNC: 138 MMOL/L (ref 132–146)
TOTAL PROTEIN: 6.3 G/DL (ref 6.4–8.3)
TRIGL SERPL-MCNC: 164 MG/DL (ref 0–149)
TSH SERPL DL<=0.05 MIU/L-ACNC: 1.69 UIU/ML (ref 0.27–4.2)
VLDLC SERPL CALC-MCNC: 33 MG/DL
WBC # BLD: 10.2 E9/L (ref 4.5–11.5)

## 2020-02-17 PROCEDURE — 84443 ASSAY THYROID STIM HORMONE: CPT

## 2020-02-17 PROCEDURE — 80061 LIPID PANEL: CPT

## 2020-02-17 PROCEDURE — 36415 COLL VENOUS BLD VENIPUNCTURE: CPT

## 2020-02-17 PROCEDURE — 85025 COMPLETE CBC W/AUTO DIFF WBC: CPT

## 2020-02-17 PROCEDURE — 80053 COMPREHEN METABOLIC PANEL: CPT

## 2020-02-17 PROCEDURE — 83036 HEMOGLOBIN GLYCOSYLATED A1C: CPT

## 2020-05-06 ENCOUNTER — HOSPITAL ENCOUNTER (OUTPATIENT)
Age: 85
Discharge: HOME | End: 2020-05-08
Payer: MEDICARE

## 2020-05-06 PROCEDURE — 87088 URINE BACTERIA CULTURE: CPT

## 2020-05-08 LAB — URINE CULTURE, ROUTINE: NORMAL

## 2021-01-26 ENCOUNTER — IMMUNIZATION (OUTPATIENT)
Dept: PRIMARY CARE CLINIC | Age: 86
End: 2021-01-26
Payer: MEDICARE

## 2021-01-26 PROCEDURE — 0011A COVID-19, MODERNA VACCINE 100MCG/0.5ML DOSE: CPT | Performed by: NURSE PRACTITIONER

## 2021-01-26 PROCEDURE — 91301 COVID-19, MODERNA VACCINE 100MCG/0.5ML DOSE: CPT | Performed by: NURSE PRACTITIONER

## 2021-02-23 ENCOUNTER — IMMUNIZATION (OUTPATIENT)
Dept: PRIMARY CARE CLINIC | Age: 86
End: 2021-02-23
Payer: MEDICARE

## 2021-02-23 PROCEDURE — 91301 COVID-19, MODERNA VACCINE 100MCG/0.5ML DOSE: CPT | Performed by: NURSE PRACTITIONER

## 2021-02-23 PROCEDURE — 0012A COVID-19, MODERNA VACCINE 100MCG/0.5ML DOSE: CPT | Performed by: NURSE PRACTITIONER

## 2021-06-15 PROBLEM — R19.7 DIARRHEA: Status: RESOLVED | Noted: 2017-04-21 | Resolved: 2021-06-15

## 2021-06-15 PROBLEM — E03.9 HYPOTHYROIDISM: Status: ACTIVE | Noted: 2021-06-15

## 2021-06-15 PROBLEM — J61 ASBESTOSIS (HCC): Status: ACTIVE | Noted: 2021-06-15

## 2021-06-15 PROBLEM — N18.30 CKD (CHRONIC KIDNEY DISEASE), STAGE III (HCC): Status: RESOLVED | Noted: 2017-04-21 | Resolved: 2021-06-15

## 2021-06-15 PROBLEM — M62.82 NON-TRAUMATIC RHABDOMYOLYSIS: Status: RESOLVED | Noted: 2017-05-19 | Resolved: 2021-06-15

## 2021-06-15 PROBLEM — H90.3 SENSORINEURAL HEARING LOSS (SNHL) OF BOTH EARS: Status: ACTIVE | Noted: 2021-06-15

## 2021-06-15 PROBLEM — M06.9 RHEUMATOID ARTHRITIS (HCC): Status: RESOLVED | Noted: 2017-04-21 | Resolved: 2021-06-15

## 2021-09-08 ENCOUNTER — HOSPITAL ENCOUNTER (OUTPATIENT)
Age: 86
Discharge: HOME | End: 2021-09-08
Payer: MEDICARE

## 2021-09-08 LAB
ANION GAP SERPL CALCULATED.3IONS-SCNC: 9 MMOL/L (ref 7–16)
BUN BLDV-MCNC: 18 MG/DL (ref 6–23)
CALCIUM SERPL-MCNC: 9.4 MG/DL (ref 8.6–10.2)
CHLORIDE BLD-SCNC: 102 MMOL/L (ref 98–107)
CHOLESTEROL, TOTAL: 125 MG/DL (ref 0–199)
CO2: 29 MMOL/L (ref 22–29)
CREAT SERPL-MCNC: 1.4 MG/DL (ref 0.7–1.2)
CREATININE URINE: 134 MG/DL (ref 40–278)
GFR AFRICAN AMERICAN: 58
GFR NON-AFRICAN AMERICAN: 48 ML/MIN/1.73
GLUCOSE BLD-MCNC: 87 MG/DL (ref 74–99)
HBA1C MFR BLD: 7.3 % (ref 4–5.6)
HCT VFR BLD CALC: 49.7 % (ref 37–54)
HDLC SERPL-MCNC: 51 MG/DL
HEMOGLOBIN: 15.6 G/DL (ref 12.5–16.5)
LDL CHOLESTEROL CALCULATED: 51 MG/DL (ref 0–99)
MCH RBC QN AUTO: 29.9 PG (ref 26–35)
MCHC RBC AUTO-ENTMCNC: 31.4 % (ref 32–34.5)
MCV RBC AUTO: 95.4 FL (ref 80–99.9)
MICROALBUMIN UR-MCNC: 22.3 MG/L
MICROALBUMIN/CREAT UR-RTO: 16.6 (ref 0–30)
PDW BLD-RTO: 13.7 FL (ref 11.5–15)
PLATELET # BLD: 246 E9/L (ref 130–450)
PMV BLD AUTO: 10.4 FL (ref 7–12)
POTASSIUM SERPL-SCNC: 4.8 MMOL/L (ref 3.5–5)
RBC # BLD: 5.21 E12/L (ref 3.8–5.8)
SODIUM BLD-SCNC: 140 MMOL/L (ref 132–146)
TRIGL SERPL-MCNC: 114 MG/DL (ref 0–149)
TSH SERPL DL<=0.05 MIU/L-ACNC: 1.91 UIU/ML (ref 0.27–4.2)
VLDLC SERPL CALC-MCNC: 23 MG/DL
WBC # BLD: 12.8 E9/L (ref 4.5–11.5)

## 2021-09-08 PROCEDURE — 83036 HEMOGLOBIN GLYCOSYLATED A1C: CPT

## 2021-09-08 PROCEDURE — 36415 COLL VENOUS BLD VENIPUNCTURE: CPT

## 2021-09-08 PROCEDURE — 85027 COMPLETE CBC AUTOMATED: CPT

## 2021-09-08 PROCEDURE — 82044 UR ALBUMIN SEMIQUANTITATIVE: CPT

## 2021-09-08 PROCEDURE — 84443 ASSAY THYROID STIM HORMONE: CPT

## 2021-09-08 PROCEDURE — 80061 LIPID PANEL: CPT

## 2021-09-08 PROCEDURE — 80048 BASIC METABOLIC PNL TOTAL CA: CPT

## 2021-09-08 PROCEDURE — 82570 ASSAY OF URINE CREATININE: CPT

## 2021-09-22 PROBLEM — J69.0 ASPIRATION PNEUMONIA (HCC): Status: RESOLVED | Noted: 2017-05-19 | Resolved: 2021-09-22

## 2021-09-22 PROBLEM — K29.71 HEMORRHAGIC GASTRITIS: Status: RESOLVED | Noted: 2017-05-24 | Resolved: 2021-09-22

## 2021-09-22 PROBLEM — J18.9 PNEUMONIA: Status: RESOLVED | Noted: 2017-04-21 | Resolved: 2021-09-22

## 2021-09-22 PROBLEM — L21.9 SEBORRHEIC DERMATITIS: Status: ACTIVE | Noted: 2021-09-22

## 2021-09-22 PROBLEM — K29.80 DUODENITIS: Status: RESOLVED | Noted: 2017-05-24 | Resolved: 2021-09-22

## 2021-09-22 PROBLEM — R11.2 NAUSEA AND VOMITING: Status: RESOLVED | Noted: 2017-05-19 | Resolved: 2021-09-22

## 2021-10-05 ENCOUNTER — HOSPITAL ENCOUNTER (OUTPATIENT)
Dept: SPEECH THERAPY | Age: 86
Setting detail: THERAPIES SERIES
Discharge: HOME | End: 2021-10-05
Payer: MEDICARE

## 2021-10-05 PROCEDURE — 92610 EVALUATE SWALLOWING FUNCTION: CPT

## 2021-10-05 NOTE — PROGRESS NOTES
[address] [place] [address]  Outpatient Speech Therapy  Phone: [phone] Fax: [phone]         SPEECH/LANGUAGE PATHOLOGY  CLINICAL ASSESSMENT OF SWALLOWING FUNCTION   and PLAN OF CARE    PATIENT NAME:  Malorie Snyder  (male)     MRN:  [id number]    :  1934  (80 y.o.)  STATUS: Outpatient clinic    TODAY'S DATE:  10/5/2021  REFERRING PROVIDER:   Dr. Kelvin Irwin: [facility] Date of order:  21  REASON FOR REFERRAL: oropharyngeal dysphagia (R13.12)   EVALUATING THERAPIST: Cris Self, SLP   CERTIFICATION/RECERTIFICATION PERIOD: 10/5/2021 to 21    SIGNIFICANT INFORMATION:  Malorie Snyder was referred by Dr. Rigoberto Ayala to [address] [place] [address]'s Outpatient Speech Pathology Department for dysphagia evaluation and treatment due to a diagnosis of oropharyngeal dysphagia (R13.12). Patient was accompanied to the session by his niece, Charisma Lagos. Together, they provided medical history. Temperatures were taken prior to the session. [facility] COVID-19 screening questionnaire was negative. Mask and goggles were worn by SLP. Patient and family wore a mask. Patient's great niece, Daniel Huston, lives with Mr. Simran Garcia. When the patient takes his medications with water in the morning he has to tuck his chin down in order to initiate a swallow. Occasional coughing noted during meals as well. Difficulty swallowing has been noted the past 5 months. Patient also has a history of COPD and GERD. His niece Kendra Rdz has tried thickener in the past, but the patient does not drink it quickly enough and the thickener gets too thick.                 RESULTS:    DYSPHAGIA DIAGNOSIS:   Clinical indicators of mild oropharyngeal phase dysphagia       DIET RECOMMENDATIONS:  Regular consistency solids with thin liquids     FEEDING RECOMMENDATIONS:     Assistance level:  Supervision is needed during all oral intake      Compensatory strategies recommended: Small bites/sips and No straw      Discussed recommendations with nursing and/or faxed report to referring provider: Yes    SPEECH THERAPY  PLAN OF CARE   The dysphagia POC is established based on physician order, dysphagia diagnosis and results of clinical assessment     Will establish POC once MBSS is completed. Conditions Requiring Skilled Therapeutic Intervention for dysphagia:    Oral motor strength/coordination impairment  Slight change in vocal quality during PO intake  Incoordination of swallow/breathing pattern-audible expiration     Specific dysphagia interventions to include:     Compensatory strategy training   Therapeutic exercises    Specific instructions for next treatment:  MBSS to be completed    Patient Treatment Goals:    Short Term Goals:  1. Pt will participate in MBSS to fully assess oropharyngeal swallow function and to assist in determining the least restrictive PO diet to maintain adequate nutrition/hydration     Long Term Goals:  1. Pt will maintain adequate nutrition/hydration via PO intake of the least restrictive oral diet with implementation of safe swallow/ compensatory strategies and decrease signs/symptoms of aspiration to less than 1 x/day. 2. POC will be updated once MBSS is completed. Patient/family Goal:    To swallow medications safely. Plan of care discussed with Patient and Family   The Patient and Family understand(s) the diagnosis, prognosis and plan of care     Rehabilitation Potential/Prognosis: good                    REFERRING DIAGNOSIS: Dysphagia, oropharyngeal phase [R13.12]    PATIENT REPORT/COMPLAINT/DATE OF ONSET: trouble swallowing medication, occasional coughing during meals.        PRIOR LEVEL OF SWALLOW FUNCTION:    PAST HISTORY OF DYSPHAGIA?: none reported    Home diet: Regular consistency solids with thin liquids    PROCEDURE:  Consistencies Administered During the Evaluation Liquids: thin liquid   Solids:  pureed foods and solid foods      Method of Intake:   cup, spoon  Self fed     Position:   Seated, upright    CLINICAL ASSESSMENT:  Oral Stage:       Delayed A-P transit due to: reduced lingual strength       Pharyngeal Stage:    · Slight change in vocal quality was noted after presentation of thin liquid and pureed foods  · Multiple swallows were noted after presentation of pureed foods and solid foods  · Delayed initiation of the pharyngeal swallow noted    Cognition:   Hard of hearing-Patient lost two sets of hearing aids. Oral Peripheral Examination   Generalized oral weakness and Left labiobuccal weakness    Current Respiratory Status    room air     Parameters of Speech Production  Respiration:  Shortness of breath  Quality:   Within functional limits  Intensity: Loud    Volitional Swallow: present     Volitional Cough:   present     Pain: No pain reported. EDUCATION:   The Speech Language Pathologist (SLP) completed education regarding results of evaluation and that intervention is warranted at this time. Learner: Patient and Family  Education: Reviewed results and recommendations of this evaluation  Evaluation of Education:  Nicolas understanding    This plan may be re-evaluated and revised as warranted. Evaluation Time includes thorough review of current medical information, gathering information on past medical history/social history and prior level of function, completion of standardized testing/informal observation of tasks, assessment of data and education on plan of care and goals. [x]The admitting diagnosis and active problem list, have been reviewed prior to initiation of this evaluation.         ACTIVE PROBLEM LIST:   Patient Active Problem List   Diagnosis    Diabetes mellitus ([address]    Dysphagia    Hiatal hernia with GERD and esophagitis (EGD 5-23-17: Dr. Kaykay Ambrosio)   Shelley Se Sensorineural hearing loss (SNHL) of both ears    Asbestosis ([address]    Hypothyroidism    Seborrheic dermatitis         CPT code:  89607  bedside swallow jackelyn Durand M.S., CCC-SLP/L  Speech-Language Pathologist      [address]     Phone: [phone]    If you have any questions or concerns, please don't hesitate to call. Thank you for your referral.    Physician/Provider Signature:________________________________Date:__________________    By signing above, the therapists plan is approved by the physician/provider. All patients under [employer] must be signed by physician/provider.

## 2021-11-03 ENCOUNTER — HOSPITAL ENCOUNTER (OUTPATIENT)
Dept: SPEECH THERAPY | Age: 86
Setting detail: THERAPIES SERIES
Discharge: HOME | End: 2021-11-03
Payer: MEDICARE

## 2021-11-03 NOTE — PROGRESS NOTES
SPEECH-LANGUAGE PATHOLOGY  DISCHARGE SUMMARY    PATIENT NAME:  Catie Ellis  (male)     MRN:  [id number]    :  1934  (80 y.o.)  STATUS: Outpatient clinic    DISCHARGE DATE:  11/3/2021  REFERRING PROVIDER:   Dr. Rosa Julien: [facility] Date of order:  21  REASON FOR REFERRAL: oropharyngeal dysphagia (R13.12)   EVALUATING THERAPIST: Estrada Salinas SLP       SIGNIFICANT INFORMATION:  Catie Ellis was referred by Dr. Monta Lundborg to [address] [place] [address]'s Outpatient Speech Pathology Department for dysphagia evaluation and treatment due to a diagnosis of oropharyngeal dysphagia (R13.12). Patient was accompanied to the initial session by his niece, Raisa Glover. Together, they provided medical history. Patient's great niece, Elvia Carranza, lives with Mr. Sheryle Lorenzo. When the patient takes his medications with water in the morning he has to tuck his chin down in order to initiate a swallow. Occasional coughing noted during meals as well. Difficulty swallowing has been noted the past 5 months. Patient also has a history of COPD and GERD. His niece Rob Cuellar has tried thickener in the past, but the patient does not drink it quickly enough and the thickener gets too thick. SUMMARY OF CLINICAL ASSESSMENT OF SWALLOW FUNCTION COMPLETED ON 10/5/21:  REFERRING DIAGNOSIS: Dysphagia, oropharyngeal phase [R13.12]    PATIENT REPORT/COMPLAINT/DATE OF ONSET: trouble swallowing medication, occasional coughing during meals.     PAST HISTORY OF DYSPHAGIA?: none reported    HOME DIET: Regular consistency solids with thin liquids    PROCEDURE:  Consistencies Administered During the Evaluation:   Liquids: thin liquid   Solids:  pureed foods and solid foods      Method of Intake:   cup, spoon  Self fed     Position:   Seated, upright    CLINICAL ASSESSMENT:  Oral Stage:      · Delayed A-P transit due to: reduced lingual strength       Pharyngeal Stage: · Slight change in vocal quality was noted after presentation of thin liquid and pureed foods  · Multiple swallows were noted after presentation of pureed foods and solid foods  · Delayed initiation of the pharyngeal swallow noted    Oral Peripheral Examination:   · Generalized oral weakness and Left labiobuccal weakness    DYSPHAGIA DIAGNOSIS:     Admission/Discharge:  Clinical indicators of mild oropharyngeal phase dysphagia       DIET RECOMMENDATIONS:    Admission/Discharge:   Regular consistency solids with thin liquids     FEEDING RECOMMENDATIONS:    Admission/Discharge:    Assistance level:  Supervision is needed during all oral intake      Compensatory strategies recommended: Small bites/sips and No straw      SPEECH THERAPY  PLAN OF CARE   The dysphagia POC was established based on physician order, dysphagia diagnosis and results of clinical assessment     Patient Treatment Goals:  Progress in therapy was recorded using the following key:  NC= no change; IMP= improved; ACH= achieved; NEI= not enough information      Short Term Goals:  1. Pt will participate in MBSS to fully assess oropharyngeal swallow function and to assist in determining the least restrictive PO diet to maintain adequate nutrition/hydration -NC    Long Term Goals:  1. Pt will maintain adequate nutrition/hydration via PO intake of the least restrictive oral diet with implementation of safe swallow/ compensatory strategies and decrease signs/symptoms of aspiration to less than 1 x/day. -NC  2. POC will be updated once MBSS is completed. -NC    Rehabilitation Potential/Prognosis: good    DISCHARGE SUMMARY:  Amie Moritz was recommended for MBSS. However, patient opted not to complete the MBSS. SLP contacted the patient's POA and niece, Alcides Harper on 11/3/21. She requested discharge from therapy. Patient completed the initial evaluation only. A new prescription would be necessary to resume outpatient therapy.   Ms. Matthew Nation verbalized understanding.

## 2021-12-27 ENCOUNTER — HOSPITAL ENCOUNTER (OUTPATIENT)
Age: 86
Discharge: HOME | End: 2021-12-27
Payer: MEDICARE

## 2021-12-27 ENCOUNTER — OFFICE VISIT (OUTPATIENT)
Dept: FAMILY MEDICINE CLINIC | Age: 86
End: 2021-12-27
Payer: MEDICARE

## 2021-12-27 VITALS
RESPIRATION RATE: 18 BRPM | OXYGEN SATURATION: 94 % | WEIGHT: 200 LBS | HEIGHT: 72 IN | TEMPERATURE: 98.9 F | BODY MASS INDEX: 27.09 KG/M2 | DIASTOLIC BLOOD PRESSURE: 58 MMHG | SYSTOLIC BLOOD PRESSURE: 96 MMHG | HEART RATE: 99 BPM

## 2021-12-27 DIAGNOSIS — R06.2 WHEEZING: ICD-10-CM

## 2021-12-27 DIAGNOSIS — R60.0 EDEMA OF LEFT LOWER EXTREMITY: Primary | ICD-10-CM

## 2021-12-27 LAB
ANION GAP SERPL CALCULATED.3IONS-SCNC: 8 MMOL/L (ref 7–16)
BUN BLDV-MCNC: 18 MG/DL (ref 6–23)
CALCIUM SERPL-MCNC: 10 MG/DL (ref 8.6–10.2)
CHLORIDE BLD-SCNC: 99 MMOL/L (ref 98–107)
CHOLESTEROL, TOTAL: 147 MG/DL (ref 0–199)
CO2: 30 MMOL/L (ref 22–29)
CREAT SERPL-MCNC: 1.2 MG/DL (ref 0.7–1.2)
GFR AFRICAN AMERICAN: >60
GFR NON-AFRICAN AMERICAN: 57 ML/MIN/1.73
GLUCOSE BLD-MCNC: 173 MG/DL (ref 74–99)
HBA1C MFR BLD: 8 % (ref 4–5.6)
HCT VFR BLD CALC: 52.3 % (ref 37–54)
HDLC SERPL-MCNC: 54 MG/DL
HEMOGLOBIN: 16.3 G/DL (ref 12.5–16.5)
LDL CHOLESTEROL CALCULATED: 67 MG/DL (ref 0–99)
MCH RBC QN AUTO: 29.9 PG (ref 26–35)
MCHC RBC AUTO-ENTMCNC: 31.2 % (ref 32–34.5)
MCV RBC AUTO: 96 FL (ref 80–99.9)
PDW BLD-RTO: 13 FL (ref 11.5–15)
PLATELET # BLD: 241 E9/L (ref 130–450)
PMV BLD AUTO: 9.9 FL (ref 7–12)
POTASSIUM SERPL-SCNC: 4.2 MMOL/L (ref 3.5–5)
RBC # BLD: 5.45 E12/L (ref 3.8–5.8)
SODIUM BLD-SCNC: 137 MMOL/L (ref 132–146)
TRIGL SERPL-MCNC: 131 MG/DL (ref 0–149)
TSH SERPL DL<=0.05 MIU/L-ACNC: 2.09 UIU/ML (ref 0.27–4.2)
VLDLC SERPL CALC-MCNC: 26 MG/DL
WBC # BLD: 12.3 E9/L (ref 4.5–11.5)

## 2021-12-27 PROCEDURE — 85027 COMPLETE CBC AUTOMATED: CPT

## 2021-12-27 PROCEDURE — 1036F TOBACCO NON-USER: CPT | Performed by: STUDENT IN AN ORGANIZED HEALTH CARE EDUCATION/TRAINING PROGRAM

## 2021-12-27 PROCEDURE — 1123F ACP DISCUSS/DSCN MKR DOCD: CPT | Performed by: STUDENT IN AN ORGANIZED HEALTH CARE EDUCATION/TRAINING PROGRAM

## 2021-12-27 PROCEDURE — 83036 HEMOGLOBIN GLYCOSYLATED A1C: CPT

## 2021-12-27 PROCEDURE — 4040F PNEUMOC VAC/ADMIN/RCVD: CPT | Performed by: STUDENT IN AN ORGANIZED HEALTH CARE EDUCATION/TRAINING PROGRAM

## 2021-12-27 PROCEDURE — G8427 DOCREV CUR MEDS BY ELIG CLIN: HCPCS | Performed by: STUDENT IN AN ORGANIZED HEALTH CARE EDUCATION/TRAINING PROGRAM

## 2021-12-27 PROCEDURE — 80048 BASIC METABOLIC PNL TOTAL CA: CPT

## 2021-12-27 PROCEDURE — 36415 COLL VENOUS BLD VENIPUNCTURE: CPT

## 2021-12-27 PROCEDURE — G8417 CALC BMI ABV UP PARAM F/U: HCPCS | Performed by: STUDENT IN AN ORGANIZED HEALTH CARE EDUCATION/TRAINING PROGRAM

## 2021-12-27 PROCEDURE — 80061 LIPID PANEL: CPT

## 2021-12-27 PROCEDURE — G8484 FLU IMMUNIZE NO ADMIN: HCPCS | Performed by: STUDENT IN AN ORGANIZED HEALTH CARE EDUCATION/TRAINING PROGRAM

## 2021-12-27 PROCEDURE — 84443 ASSAY THYROID STIM HORMONE: CPT

## 2021-12-27 PROCEDURE — 99213 OFFICE O/P EST LOW 20 MIN: CPT | Performed by: STUDENT IN AN ORGANIZED HEALTH CARE EDUCATION/TRAINING PROGRAM

## 2021-12-27 RX ORDER — ACETAMINOPHEN 325 MG/1
650 TABLET ORAL EVERY 6 HOURS PRN
COMMUNITY
End: 2022-01-19

## 2021-12-27 RX ORDER — GLIMEPIRIDE 2 MG/1
1 TABLET ORAL DAILY
COMMUNITY
Start: 2021-09-22 | End: 2022-01-19

## 2021-12-27 RX ORDER — DUTASTERIDE 0.5 MG/1
0.5 CAPSULE, LIQUID FILLED ORAL
COMMUNITY
End: 2022-01-19

## 2021-12-27 RX ORDER — FUROSEMIDE 20 MG/1
20 TABLET ORAL DAILY
Qty: 4 TABLET | Refills: 0 | Status: SHIPPED
Start: 2021-12-27 | End: 2022-01-19

## 2021-12-27 RX ORDER — ALBUTEROL SULFATE 2.5 MG/3ML
3 SOLUTION RESPIRATORY (INHALATION)
COMMUNITY
Start: 2021-10-28 | End: 2022-06-07 | Stop reason: SDUPTHER

## 2021-12-27 RX ORDER — LEVOTHYROXINE SODIUM 75 UG/1
1 TABLET ORAL DAILY
COMMUNITY
Start: 2021-11-24 | End: 2022-01-19

## 2021-12-27 RX ORDER — ASPIRIN 81 MG/1
81 TABLET ORAL DAILY
COMMUNITY

## 2021-12-27 RX ORDER — ROSUVASTATIN CALCIUM 10 MG/1
1 TABLET, COATED ORAL DAILY
COMMUNITY
Start: 2021-11-22 | End: 2022-01-19

## 2021-12-27 RX ORDER — ACETAMINOPHEN 160 MG
1 TABLET,DISINTEGRATING ORAL DAILY
COMMUNITY

## 2021-12-27 ASSESSMENT — ENCOUNTER SYMPTOMS
CHEST TIGHTNESS: 0
COUGH: 0
VOMITING: 0
ABDOMINAL PAIN: 0
BACK PAIN: 0
NAUSEA: 0
RHINORRHEA: 0
CONSTIPATION: 0
EYE PAIN: 0
SHORTNESS OF BREATH: 0
DIARRHEA: 0

## 2021-12-27 NOTE — PROGRESS NOTES
2021    Marimar Harrington (:  1934) is a 80 y.o. male, here for evaluation of the following medical concerns:    HPI  Chief Complaint   Patient presents with    Foot Swelling     x 1 week and painful, pt also having strong smelling urine     Patient has been having left foot swelling and pain and strong smelling urine. This has been going on since day before lorenzo. He is very hard of hearing so his remember had answered all of his questions. She states that he does not really have any other symptoms at this time. However he was unable to bring us a urine. We have him a cup to bring it back for us so that we can run it for urinalysis and check for any infection or any ketones. Patient denies CP, nausea, vomiting, diarrhea, fevers chills sweats, abdominal pain, numbness tingling, dizziness, lightheadedness, back pain, ear pain, eye pain, nasal congestion, headaches, blurry vision. He has SOB but this is his baseline     Review of Systems   Constitutional: Negative for chills, fatigue and fever. HENT: Negative for congestion, ear pain, postnasal drip and rhinorrhea. Eyes: Negative for pain. Respiratory: Negative for cough, chest tightness and shortness of breath. Cardiovascular: Positive for leg swelling (left foot). Negative for chest pain. Gastrointestinal: Negative for abdominal pain, constipation, diarrhea, nausea and vomiting. Genitourinary: Negative for difficulty urinating, dysuria and frequency. Musculoskeletal: Negative for back pain and myalgias. Skin: Negative for rash. Neurological: Negative for dizziness, light-headedness, numbness and headaches. Prior to Visit Medications    Medication Sig Taking?  Authorizing Provider   albuterol (PROVENTIL) (2.5 MG/3ML) 0.083% nebulizer solution Take 3 mLs by nebulization every 4-6 hours as needed Yes Historical Provider, MD   diclofenac sodium (VOLTAREN) 1 % GEL  Yes Historical Provider, MD   glimepiride (AMARYL) 2 MG tablet Take 1 tablet by mouth daily Yes Historical Provider, MD   EUTHYROX 75 MCG tablet Take 1 tablet by mouth daily Yes Historical Provider, MD   rosuvastatin (CRESTOR) 10 MG tablet Take 1 tablet by mouth daily Yes Historical Provider, MD   acetaminophen (TYLENOL) 325 MG tablet Take 650 mg by mouth every 6 hours as needed for Pain Yes Historical Provider, MD   aspirin 81 MG EC tablet Take 81 mg by mouth daily Yes Historical Provider, MD   Cholecalciferol (VITAMIN D3) 50 MCG (2000 UT) CAPS Take 1 capsule by mouth daily Yes Historical Provider, MD   dutasteride (AVODART) 0.5 MG capsule Take 0.5 mg by mouth three times a week Yes Historical Provider, MD   Aspirin Effervescent 325 MG TBEF tablet Take 325 mg by mouth daily as needed Yes Historical Provider, MD   furosemide (LASIX) 20 MG tablet Take 1 tablet by mouth daily Yes Luly Florez DO   azithromycin (ZITHROMAX) 250 MG tablet Take 1 tablet by mouth See Admin Instructions for 5 days 500mg on day 1 followed by 250mg on days 2 - 5  Luly Florez DO   amoxicillin-clavulanate (AUGMENTIN) 875-125 MG per tablet Take 1 tablet by mouth 2 times daily for 7 days  Luly Florez DO        No Known Allergies    History reviewed. No pertinent past medical history. History reviewed. No pertinent surgical history.     Social History     Socioeconomic History    Marital status: Single     Spouse name: Not on file    Number of children: Not on file    Years of education: Not on file    Highest education level: Not on file   Occupational History    Not on file   Tobacco Use    Smoking status: Never Smoker    Smokeless tobacco: Never Used   Substance and Sexual Activity    Alcohol use: Never    Drug use: Never    Sexual activity: Not on file   Other Topics Concern    Not on file   Social History Narrative    Not on file     Social Determinants of Health     Financial Resource Strain:     Difficulty of Paying Living Expenses: Not on file   Food Insecurity:     Worried About [facility] in the Last Year: Not on file    Gabe of Food in the Last Year: Not on file   Transportation Needs:     Lack of Transportation (Medical): Not on file    Lack of Transportation (Non-Medical): Not on file   Physical Activity:     Days of Exercise per Week: Not on file    Minutes of Exercise per Session: Not on file   Stress:     Feeling of Stress : Not on file   Social Connections:     Frequency of Communication with Friends and Family: Not on file    Frequency of Social Gatherings with Friends and Family: Not on file    Attends Hinduism Services: Not on file    Active Member of [address] [place] [address] or Organizations: Not on file    Attends Club or Organization Meetings: Not on file    Marital Status: Not on file   Intimate Partner Violence:     Fear of Current or Ex-Partner: Not on file    Emotionally Abused: Not on file    Physically Abused: Not on file    Sexually Abused: Not on file   Housing Stability:     Unable to Pay for Housing in the Last Year: Not on file    Number of [place] in the Last Year: Not on file    Unstable Housing in the Last Year: Not on file        History reviewed. No pertinent family history. Vitals:    12/27/21 1348 12/27/21 1411   BP: (!) 90/56 (!) 96/58   Pulse: 99    Resp: 18    Temp: 98.9 °F (37.2 °C)    TempSrc: Infrared    SpO2: 94%    Weight: 200 lb (90.7 kg)    Height: 6' (1.829 m)      Estimated body mass index is 27.12 kg/m² as calculated from the following:    Height as of this encounter: 6' (1.829 m). Weight as of this encounter: 200 lb (90.7 kg).     Most Recent Labs  CBC  Lab Results   Component Value Date    WBC 12.3 12/27/2021    WBC 12.8 09/08/2021    RBC 5.45 12/27/2021    RBC 5.21 09/08/2021    HGB 16.3 12/27/2021    HGB 15.6 09/08/2021    HCT 52.3 12/27/2021    HCT 49.7 09/08/2021    MCV 96.0 12/27/2021    MCV 95.4 09/08/2021     12/27/2021     09/08/2021      CMP  Lab Results   Component Value Date  12/27/2021     09/08/2021    K 4.2 12/27/2021    K 4.8 09/08/2021    CL 99 12/27/2021     09/08/2021    CO2 30 12/27/2021    CO2 29 09/08/2021    ANIONGAP 8 12/27/2021    ANIONGAP 9 09/08/2021    GLUCOSE 173 12/27/2021    GLUCOSE 87 09/08/2021    BUN 18 12/27/2021    BUN 18 09/08/2021    CREATININE 1.2 12/27/2021    CREATININE 1.4 09/08/2021    LABGLOM 57 12/27/2021    LABGLOM 48 09/08/2021    GFRAA >60 12/27/2021    GFRAA 58 09/08/2021    CALCIUM 10.0 12/27/2021    CALCIUM 9.4 09/08/2021     A1C  Lab Results   Component Value Date    LABA1C 8.0 12/27/2021    LABA1C 7.3 09/08/2021     TSH  Lab Results   Component Value Date    TSH 2.090 12/27/2021    TSH 1.910 09/08/2021     LIPID  Lab Results   Component Value Date    CHOL 147 12/27/2021    CHOL 125 09/08/2021    HDL 54 12/27/2021    HDL 51 09/08/2021    LDLCALC 67 12/27/2021    LDLCALC 51 09/08/2021    TRIG 131 12/27/2021    TRIG 114 09/08/2021     Urine Micro/Albumin Ratio  Lab Results   Component Value Date    MALBCR 16.6 09/08/2021        Physical Exam  Vitals and nursing note reviewed. Constitutional:       Appearance: Normal appearance. HENT:      Head: Normocephalic and atraumatic. Right Ear: Tympanic membrane, ear canal and external ear normal.      Left Ear: Tympanic membrane, ear canal and external ear normal.      Ears:      Comments: Hard of hearing     Nose: Nose normal.      Mouth/Throat:      Mouth: Mucous membranes are moist.   Eyes:      Extraocular Movements: Extraocular movements intact. Pupils: Pupils are equal, round, and reactive to light. Cardiovascular:      Rate and Rhythm: Normal rate and regular rhythm. Pulses: Normal pulses. Heart sounds: Normal heart sounds. Pulmonary:      Effort: Pulmonary effort is normal.      Breath sounds: Wheezing present. Abdominal:      General: Bowel sounds are normal.      Palpations: Abdomen is soft. Musculoskeletal:         General: Tenderness present. Normal range of motion. Cervical back: Normal range of motion and neck supple. Left lower leg: Edema present. Skin:     General: Skin is warm and dry. Capillary Refill: Capillary refill takes less than 2 seconds. Neurological:      General: No focal deficit present. Mental Status: He is alert and oriented to person, place, and time. Psychiatric:         Mood and Affect: Mood normal.         Behavior: Behavior normal.         Thought Content: Thought content normal.         ASSESSMENT/PLAN:  Sara Cam was seen today for foot swelling. Diagnoses and all orders for this visit:    Edema of left lower extremity  -     XR FOOT LEFT (MIN 3 VIEWS); Future  -     US DUP LOWER EXTREMITY LEFT MAGY; Future  -     XR CHEST STANDARD (2 VW); Future  -     furosemide (LASIX) 20 MG tablet; Take 1 tablet by mouth daily    Wheezing  -     XR CHEST STANDARD (2 VW); Future       Patient was wheezing so I did order chest x-ray. It did show bilateral pneumonia. Antibiotics were sent in for him today. As above. Call or go to the nearest ED immediately if symptoms worsen or persist.  Educational materials and/or home exercises printed for patient's review and were included in patient instructions on his/her After Visit Summary and given to patient at the end of visit. Counseled regarding above diagnosis, including possible risks and complications,  especially if left uncontrolled. Counseled regarding the possible side effects, risks, benefits and alternatives to treatment; patient and/or guardian verbalizes understanding, agrees, feels comfortable with and wishes to proceed with above treatment plan. Advised patient to call with any new medication issues, and read all Rx info from pharmacy to assure aware of all possible risks and side effects of medication before taking. Reviewed age and gender appropriate health screening exams and vaccinations.   Advised patient regarding importance of keeping up with recommended health maintenance and to schedule as soon as possible if overdue, as this is important in assessing for undiagnosed pathology, especially cancer, as well as protecting against potentially harmful/life threatening disease. Patient and/or guardian verbalizes understanding and agrees with above counseling, assessment and plan. All questions answered. Return in about 3 days (around 12/30/2021). An  electronic signature was used to authenticate this note.     --Perfecto Waite, DO on 12/28/2021 at 5:46 PM

## 2021-12-28 ENCOUNTER — HOSPITAL ENCOUNTER (OUTPATIENT)
Age: 86
Discharge: HOME | End: 2021-12-30
Payer: MEDICARE

## 2021-12-28 ENCOUNTER — HOSPITAL ENCOUNTER (OUTPATIENT)
Dept: GENERAL RADIOLOGY | Age: 86
Discharge: HOME | End: 2021-12-30
Payer: MEDICARE

## 2021-12-28 DIAGNOSIS — R60.0 EDEMA OF LEFT LOWER EXTREMITY: ICD-10-CM

## 2021-12-28 DIAGNOSIS — R06.2 WHEEZING: ICD-10-CM

## 2021-12-28 DIAGNOSIS — J18.9 PNEUMONIA OF BOTH LUNGS DUE TO INFECTIOUS ORGANISM, UNSPECIFIED PART OF LUNG: Primary | ICD-10-CM

## 2021-12-28 PROCEDURE — 71046 X-RAY EXAM CHEST 2 VIEWS: CPT

## 2021-12-28 PROCEDURE — 73630 X-RAY EXAM OF FOOT: CPT

## 2021-12-28 RX ORDER — AZITHROMYCIN 250 MG/1
250 TABLET, FILM COATED ORAL SEE ADMIN INSTRUCTIONS
Qty: 6 TABLET | Refills: 0 | Status: SHIPPED | OUTPATIENT
Start: 2021-12-28 | End: 2022-01-02

## 2021-12-28 RX ORDER — AMOXICILLIN AND CLAVULANATE POTASSIUM 875; 125 MG/1; MG/1
1 TABLET, FILM COATED ORAL 2 TIMES DAILY
Qty: 14 TABLET | Refills: 0 | Status: SHIPPED | OUTPATIENT
Start: 2021-12-28 | End: 2022-01-04

## 2022-01-19 PROBLEM — R13.10 DYSPHAGIA: Status: RESOLVED | Noted: 2017-05-23 | Resolved: 2022-01-19

## 2022-05-17 ENCOUNTER — HOSPITAL ENCOUNTER (OUTPATIENT)
Age: 87
Discharge: HOME | End: 2022-05-17
Payer: MEDICARE

## 2022-05-17 DIAGNOSIS — E11.9 TYPE 2 DIABETES MELLITUS WITHOUT COMPLICATION, WITHOUT LONG-TERM CURRENT USE OF INSULIN (HCC): ICD-10-CM

## 2022-05-17 LAB
ANION GAP SERPL CALCULATED.3IONS-SCNC: 11 MMOL/L (ref 7–16)
BUN BLDV-MCNC: 22 MG/DL (ref 6–23)
CALCIUM SERPL-MCNC: 9.4 MG/DL (ref 8.6–10.2)
CHLORIDE BLD-SCNC: 102 MMOL/L (ref 98–107)
CHOLESTEROL, TOTAL: 142 MG/DL (ref 0–199)
CO2: 28 MMOL/L (ref 22–29)
CREAT SERPL-MCNC: 1.5 MG/DL (ref 0.7–1.2)
GFR AFRICAN AMERICAN: 53
GFR NON-AFRICAN AMERICAN: 44 ML/MIN/1.73
GLUCOSE BLD-MCNC: 292 MG/DL (ref 74–99)
HBA1C MFR BLD: 9.2 % (ref 4–5.6)
HCT VFR BLD CALC: 51.1 % (ref 37–54)
HDLC SERPL-MCNC: 51 MG/DL
HEMOGLOBIN: 16.1 G/DL (ref 12.5–16.5)
LDL CHOLESTEROL CALCULATED: 55 MG/DL (ref 0–99)
MCH RBC QN AUTO: 30.5 PG (ref 26–35)
MCHC RBC AUTO-ENTMCNC: 31.5 % (ref 32–34.5)
MCV RBC AUTO: 96.8 FL (ref 80–99.9)
PDW BLD-RTO: 13.4 FL (ref 11.5–15)
PLATELET # BLD: 215 E9/L (ref 130–450)
PMV BLD AUTO: 10.7 FL (ref 7–12)
POTASSIUM SERPL-SCNC: 4.9 MMOL/L (ref 3.5–5)
RBC # BLD: 5.28 E12/L (ref 3.8–5.8)
SODIUM BLD-SCNC: 141 MMOL/L (ref 132–146)
TRIGL SERPL-MCNC: 178 MG/DL (ref 0–149)
VLDLC SERPL CALC-MCNC: 36 MG/DL
WBC # BLD: 10.4 E9/L (ref 4.5–11.5)

## 2022-05-17 PROCEDURE — 36415 COLL VENOUS BLD VENIPUNCTURE: CPT

## 2022-05-17 PROCEDURE — 80048 BASIC METABOLIC PNL TOTAL CA: CPT

## 2022-05-17 PROCEDURE — 85027 COMPLETE CBC AUTOMATED: CPT

## 2022-05-17 PROCEDURE — 83036 HEMOGLOBIN GLYCOSYLATED A1C: CPT

## 2022-05-17 PROCEDURE — 80061 LIPID PANEL: CPT

## 2022-05-20 ENCOUNTER — HOSPITAL ENCOUNTER (OUTPATIENT)
Dept: GENERAL RADIOLOGY | Age: 87
Discharge: HOME | End: 2022-05-22
Payer: MEDICARE

## 2022-05-20 ENCOUNTER — HOSPITAL ENCOUNTER (OUTPATIENT)
Age: 87
Discharge: HOME | End: 2022-05-22
Payer: MEDICARE

## 2022-05-20 DIAGNOSIS — J61 ASBESTOSIS (HCC): ICD-10-CM

## 2022-05-20 PROCEDURE — 71046 X-RAY EXAM CHEST 2 VIEWS: CPT

## 2022-05-24 PROBLEM — J84.9 ILD (INTERSTITIAL LUNG DISEASE) (HCC): Status: ACTIVE | Noted: 2022-05-24

## 2022-07-07 ENCOUNTER — HOSPITAL ENCOUNTER (OUTPATIENT)
Age: 87
Discharge: HOME | End: 2022-07-07
Payer: MEDICARE

## 2022-07-07 DIAGNOSIS — E11.9 TYPE 2 DIABETES MELLITUS WITHOUT COMPLICATION, WITHOUT LONG-TERM CURRENT USE OF INSULIN (HCC): ICD-10-CM

## 2022-07-07 LAB
ANION GAP SERPL CALCULATED.3IONS-SCNC: 12 MMOL/L (ref 7–16)
BUN BLDV-MCNC: 21 MG/DL (ref 6–23)
CALCIUM SERPL-MCNC: 9.3 MG/DL (ref 8.6–10.2)
CHLORIDE BLD-SCNC: 104 MMOL/L (ref 98–107)
CO2: 26 MMOL/L (ref 22–29)
CREAT SERPL-MCNC: 1.3 MG/DL (ref 0.7–1.2)
GFR AFRICAN AMERICAN: >60
GFR NON-AFRICAN AMERICAN: 52 ML/MIN/1.73
GLUCOSE BLD-MCNC: 157 MG/DL (ref 74–99)
HBA1C MFR BLD: 8.5 % (ref 4–5.6)
POTASSIUM SERPL-SCNC: 4.6 MMOL/L (ref 3.5–5)
SODIUM BLD-SCNC: 142 MMOL/L (ref 132–146)

## 2022-07-07 PROCEDURE — 80048 BASIC METABOLIC PNL TOTAL CA: CPT

## 2022-07-07 PROCEDURE — 36415 COLL VENOUS BLD VENIPUNCTURE: CPT

## 2022-07-07 PROCEDURE — 83036 HEMOGLOBIN GLYCOSYLATED A1C: CPT

## 2022-09-27 PROBLEM — N18.30 CHRONIC RENAL DISEASE, STAGE III (HCC): Status: ACTIVE | Noted: 2022-09-27

## 2022-09-27 PROBLEM — E08.40 DIABETES MELLITUS DUE TO UNDERLYING CONDITION WITH DIABETIC NEUROPATHY, WITHOUT LONG-TERM CURRENT USE OF INSULIN (HCC): Status: ACTIVE | Noted: 2022-09-27

## 2022-09-27 PROBLEM — E11.51 DIABETES MELLITUS WITH PERIPHERAL VASCULAR DISEASE (HCC): Status: ACTIVE | Noted: 2022-09-27

## 2022-09-27 PROBLEM — J44.1 COPD EXACERBATION (HCC): Status: ACTIVE | Noted: 2022-09-27

## 2022-09-27 PROBLEM — E11.51 DIABETES MELLITUS WITH PERIPHERAL VASCULAR DISEASE (HCC): Status: RESOLVED | Noted: 2022-09-27 | Resolved: 2022-09-27

## 2022-10-14 ENCOUNTER — OFFICE VISIT (OUTPATIENT)
Dept: FAMILY MEDICINE CLINIC | Age: 87
End: 2022-10-14
Payer: MEDICARE

## 2022-10-14 VITALS
HEIGHT: 72 IN | BODY MASS INDEX: 28.17 KG/M2 | RESPIRATION RATE: 15 BRPM | SYSTOLIC BLOOD PRESSURE: 110 MMHG | HEART RATE: 88 BPM | WEIGHT: 208 LBS | DIASTOLIC BLOOD PRESSURE: 80 MMHG | TEMPERATURE: 97.9 F | OXYGEN SATURATION: 98 %

## 2022-10-14 DIAGNOSIS — R35.0 URINARY FREQUENCY: Primary | ICD-10-CM

## 2022-10-14 DIAGNOSIS — R82.90 ABNORMAL URINE ODOR: ICD-10-CM

## 2022-10-14 LAB
BILIRUBIN, POC: NEGATIVE
BLOOD URINE, POC: NORMAL
CLARITY, POC: NORMAL
COLOR, POC: YELLOW
GLUCOSE URINE, POC: NORMAL
KETONES, POC: NEGATIVE
LEUKOCYTE EST, POC: NEGATIVE
NITRITE, POC: NEGATIVE
PH, POC: 5.5
PROTEIN, POC: NORMAL
SPECIFIC GRAVITY, POC: 1.02
UROBILINOGEN, POC: NORMAL

## 2022-10-14 PROCEDURE — G8417 CALC BMI ABV UP PARAM F/U: HCPCS

## 2022-10-14 PROCEDURE — 81002 URINALYSIS NONAUTO W/O SCOPE: CPT

## 2022-10-14 PROCEDURE — 99213 OFFICE O/P EST LOW 20 MIN: CPT

## 2022-10-14 PROCEDURE — G8484 FLU IMMUNIZE NO ADMIN: HCPCS

## 2022-10-14 PROCEDURE — 1036F TOBACCO NON-USER: CPT

## 2022-10-14 PROCEDURE — 1123F ACP DISCUSS/DSCN MKR DOCD: CPT

## 2022-10-14 PROCEDURE — G8427 DOCREV CUR MEDS BY ELIG CLIN: HCPCS

## 2022-10-14 NOTE — PROGRESS NOTES
Chief Complaint       Urinary Frequency (With odor since yesterday/ his niece states that his mood is not good and he always has a good disposition.  )    History of Present Illness   Source of history provided by:  patient's niece provided history, pt cannot hear well or answer questions. Alanis Schmidt is a 80 y.o. old male presenting to the walk in clinic for evaluation of increased urinary frequency and odor to urine x 2 days. He also has had a change in his mood. She reports this does happen at times and usually indicates he has a UTI. Denies gross hematuria. Denies associated flank pain. Denies any fever, chills, vomiting, diarrhea, or lethargy. No LMP for male patient. ROS    Unless otherwise stated in this report or unable to obtain because of the patient's clinical or mental status as evidenced by the medical record, this patients's positive and negative responses for Review of Systems, constitutional, psych, eyes, ENT, cardiovascular, respiratory, gastrointestinal, neurological, genitourinary, musculoskeletal, integument systems and systems related to the presenting problem are either stated in the preceding or were not pertinent or were negative for the symptoms and/or complaints related to the medical problem. Physical Exam         VS:  /80   Pulse 88   Temp 97.9 °F (36.6 °C) (Temporal)   Resp 15   Ht 6' (1.829 m)   Wt 208 lb (94.3 kg)   SpO2 98%   BMI 28.21 kg/m²    Oxygen Saturation Interpretation: Normal.    Constitutional:  A&Ox3, development consistent with age, NAD. Lungs:  CTAB without wheezing, rales, or rhonchi. Heart:  RRR without pathologic murmurs, rubs, or gallops. Abdomen: Soft, nondistended, without suprapubic tenderness. No rebound, rigidity, or guarding. BS+ X4. No organomegaly. Back: No CVA tenderness. Skin:  Normal turgor. Warm, dry, without visible rash, unless noted elsewhere. Neurological:  Alert and oriented. Motor functions intact. Responds to verbal commands. Lab / Imaging Results   (All laboratory and radiology results have been personally reviewed by myself)  Labs:  Results for orders placed or performed in visit on 10/14/22   POCT Urinalysis no Micro   Result Value Ref Range    Color, UA yellow     Clarity, UA cloudy     Glucose, UA POC 250mg/dl     Bilirubin, UA negative     Ketones, UA negative     Spec Grav, UA 1.025     Blood, UA POC trace     pH, UA 5.5     Protein, UA POC trace     Urobilinogen, UA 1.0E.U./dl     Leukocytes, UA negative     Nitrite, UA negative        Imaging: All Radiology results interpreted by Radiologist unless otherwise noted. Assessment / Plan     Impression(s):  Burt Blantno was seen today for urinary frequency. Diagnoses and all orders for this visit:    Urinary frequency  -     POCT Urinalysis no Micro  -     Culture, Urine    Abnormal urine odor  -     POCT Urinalysis no Micro  -     Culture, Urine    Disposition:  Disposition: Discharge to home. Pt unable to take deep breaths or follow commands on PE. Unable to answer history questions/ report symptoms. UA appears negative for a UTI. Urine C&S pending, will call with results once available. Increase fluids and rest. F/u PCP in 3-5 days if symptoms persist. ED sooner if symptoms worsen or change. ED immediately with the development of fever, shaking chills, body aches, flank pain, vomiting, CP, or SOB. Pt is in agreement with this care plan. All questions answered. RAE Henderson    **This report was transcribed using voice recognition software. Every effort was made to ensure accuracy; however, inadvertent computerized transcription errors may be present.

## 2022-10-17 LAB — URINE CULTURE, ROUTINE: NORMAL

## 2023-02-14 ENCOUNTER — HOSPITAL ENCOUNTER (OUTPATIENT)
Age: 88
Discharge: HOME | End: 2023-02-14
Payer: MEDICARE

## 2023-02-14 DIAGNOSIS — E03.8 OTHER SPECIFIED HYPOTHYROIDISM: ICD-10-CM

## 2023-02-14 DIAGNOSIS — E11.51 DIABETES MELLITUS WITH PERIPHERAL VASCULAR DISEASE (HCC): ICD-10-CM

## 2023-02-14 LAB
ANION GAP SERPL CALCULATED.3IONS-SCNC: 9 MMOL/L (ref 7–16)
BUN BLDV-MCNC: 17 MG/DL (ref 6–23)
CALCIUM SERPL-MCNC: 9.4 MG/DL (ref 8.6–10.2)
CHLORIDE BLD-SCNC: 101 MMOL/L (ref 98–107)
CHOLESTEROL, TOTAL: 134 MG/DL (ref 0–199)
CO2: 28 MMOL/L (ref 22–29)
CREAT SERPL-MCNC: 1.2 MG/DL (ref 0.7–1.2)
CREATININE URINE: 99 MG/DL (ref 40–278)
GFR SERPL CREATININE-BSD FRML MDRD: 58 ML/MIN/1.73
GLUCOSE BLD-MCNC: 86 MG/DL (ref 74–99)
HBA1C MFR BLD: 8.2 % (ref 4–5.6)
HDLC SERPL-MCNC: 52 MG/DL
LDL CHOLESTEROL CALCULATED: 55 MG/DL (ref 0–99)
MICROALBUMIN UR-MCNC: <12 MG/L
MICROALBUMIN/CREAT UR-RTO: ABNORMAL (ref 0–30)
POTASSIUM SERPL-SCNC: 4.4 MMOL/L (ref 3.5–5)
SODIUM BLD-SCNC: 138 MMOL/L (ref 132–146)
TRIGL SERPL-MCNC: 135 MG/DL (ref 0–149)
TSH SERPL DL<=0.05 MIU/L-ACNC: 1.83 UIU/ML (ref 0.27–4.2)
VLDLC SERPL CALC-MCNC: 27 MG/DL

## 2023-02-14 PROCEDURE — 82044 UR ALBUMIN SEMIQUANTITATIVE: CPT

## 2023-02-14 PROCEDURE — 36415 COLL VENOUS BLD VENIPUNCTURE: CPT

## 2023-02-14 PROCEDURE — 80061 LIPID PANEL: CPT

## 2023-02-14 PROCEDURE — 80048 BASIC METABOLIC PNL TOTAL CA: CPT

## 2023-02-14 PROCEDURE — 82570 ASSAY OF URINE CREATININE: CPT

## 2023-02-14 PROCEDURE — 83036 HEMOGLOBIN GLYCOSYLATED A1C: CPT

## 2023-02-14 PROCEDURE — 84443 ASSAY THYROID STIM HORMONE: CPT

## 2023-04-04 PROBLEM — F03.90 DEMENTIA WITHOUT BEHAVIORAL DISTURBANCE (HCC): Status: ACTIVE | Noted: 2023-04-04

## 2023-08-14 ENCOUNTER — OFFICE VISIT (OUTPATIENT)
Dept: FAMILY MEDICINE CLINIC | Age: 88
End: 2023-08-14
Payer: MEDICARE

## 2023-08-14 VITALS
DIASTOLIC BLOOD PRESSURE: 68 MMHG | SYSTOLIC BLOOD PRESSURE: 120 MMHG | BODY MASS INDEX: 27.9 KG/M2 | OXYGEN SATURATION: 92 % | TEMPERATURE: 97.5 F | WEIGHT: 206 LBS | RESPIRATION RATE: 16 BRPM | HEIGHT: 72 IN | HEART RATE: 77 BPM

## 2023-08-14 DIAGNOSIS — R82.998 LEUKOCYTES IN URINE: ICD-10-CM

## 2023-08-14 DIAGNOSIS — R32 URINARY INCONTINENCE, UNSPECIFIED TYPE: ICD-10-CM

## 2023-08-14 DIAGNOSIS — N39.0 URINARY TRACT INFECTION WITHOUT HEMATURIA, SITE UNSPECIFIED: Primary | ICD-10-CM

## 2023-08-14 LAB
BILIRUBIN, POC: ABNORMAL
BLOOD URINE, POC: ABNORMAL
CLARITY, POC: CLEAR
COLOR, POC: YELLOW
GLUCOSE URINE, POC: ABNORMAL
KETONES, POC: ABNORMAL
LEUKOCYTE EST, POC: ABNORMAL
NITRITE, POC: ABNORMAL
PH, POC: 6.5
PROTEIN, POC: ABNORMAL
SPECIFIC GRAVITY, POC: 1.02
UROBILINOGEN, POC: 0.2

## 2023-08-14 PROCEDURE — 99214 OFFICE O/P EST MOD 30 MIN: CPT | Performed by: NURSE PRACTITIONER

## 2023-08-14 PROCEDURE — 1123F ACP DISCUSS/DSCN MKR DOCD: CPT | Performed by: NURSE PRACTITIONER

## 2023-08-14 PROCEDURE — 81002 URINALYSIS NONAUTO W/O SCOPE: CPT | Performed by: NURSE PRACTITIONER

## 2023-08-14 RX ORDER — CEFDINIR 300 MG/1
300 CAPSULE ORAL 2 TIMES DAILY
Qty: 14 CAPSULE | Refills: 0 | Status: SHIPPED | OUTPATIENT
Start: 2023-08-14 | End: 2023-08-21

## 2023-08-14 NOTE — PROGRESS NOTES
23  Sheri Terrell : 1934 Sex: male  Age 80 y.o. Subjective:  Chief Complaint   Patient presents with    Incontinence     Urine incontinence since Friday. Niece gave him furosemide on Friday and then symptoms started. Edema     Bilateral feet. HPI:   Sheri Terrell , 80 y.o. male presents to the clinic with niece (POA) for evaluation of urinary incontinence x 3 days. Reports reports recently starting lasix for lower extremity edema. The patient also reports hx of BPH. The patient has not taken any treatment for symptoms. Denies gross hematuria, flank pain, rash, and penile discharge. The patient also denies headache, fever, chest pain, abdominal pain, shortness of breath, and nausea / vomiting / diarrhea. ROS:   Unless otherwise stated in this report the patient's positive and negative responses for review of systems for constitutional, eyes, ENT, cardiovascular, respiratory, gastrointestinal, neurological, , musculoskeletal, and integument systems and related systems to the presenting problem are either stated in the history of present illness or were not pertinent or were negative for the symptoms and/or complaints related to the presenting medical problem. Positives and pertinent negatives as per HPI. All others reviewed and are negative.       PMH:     Past Medical History:   Diagnosis Date    Arthritis     rheumatoid    COPD (chronic obstructive pulmonary disease) (HCC)     Diabetes mellitus ([address])     Hearing aid worn     bilateral    Hyperlipidemia     Hypothyroidism 6/15/2021       Past Surgical History:   Procedure Laterality Date    CHOLECYSTECTOMY      apx 20 years ago    COLONOSCOPY  [id number]    COLONOSCOPY  2016    ENDOSCOPY, COLON, DIAGNOSTIC      HERNIA REPAIR      unsure    UPPER GASTROINTESTINAL ENDOSCOPY  2017       Family History   Problem Relation Age of Onset    Heart Failure Mother     Cancer Father     Heart Failure Sister     Heart Disease

## 2023-08-15 LAB
CULTURE: NORMAL
SPECIMEN DESCRIPTION: NORMAL

## 2023-09-19 ENCOUNTER — HOSPITAL ENCOUNTER (OUTPATIENT)
Age: 88
Discharge: HOME | End: 2023-09-19
Payer: MEDICARE

## 2023-09-19 DIAGNOSIS — E03.8 OTHER SPECIFIED HYPOTHYROIDISM: ICD-10-CM

## 2023-09-19 DIAGNOSIS — E11.9 TYPE 2 DIABETES MELLITUS WITHOUT COMPLICATION, WITHOUT LONG-TERM CURRENT USE OF INSULIN (HCC): ICD-10-CM

## 2023-09-19 LAB
ANION GAP SERPL CALCULATED.3IONS-SCNC: 12 MMOL/L (ref 7–16)
BUN SERPL-MCNC: 21 MG/DL (ref 6–23)
CALCIUM SERPL-MCNC: 8.9 MG/DL (ref 8.6–10.2)
CHLORIDE SERPL-SCNC: 101 MMOL/L (ref 98–107)
CHOLEST SERPL-MCNC: 128 MG/DL
CO2 SERPL-SCNC: 26 MMOL/L (ref 22–29)
CREAT SERPL-MCNC: 1.4 MG/DL (ref 0.7–1.2)
ERYTHROCYTE [DISTWIDTH] IN BLOOD BY AUTOMATED COUNT: 13.4 % (ref 11.5–15)
GFR SERPL CREATININE-BSD FRML MDRD: 48 ML/MIN/1.73M2
GLUCOSE SERPL-MCNC: 93 MG/DL (ref 74–99)
HBA1C MFR BLD: 6.9 % (ref 4–5.6)
HCT VFR BLD AUTO: 49.7 % (ref 37–54)
HDLC SERPL-MCNC: 54 MG/DL
HGB BLD-MCNC: 15.5 G/DL (ref 12.5–16.5)
LDLC SERPL CALC-MCNC: 56 MG/DL
MCH RBC QN AUTO: 30.1 PG (ref 26–35)
MCHC RBC AUTO-ENTMCNC: 31.2 G/DL (ref 32–34.5)
MCV RBC AUTO: 96.5 FL (ref 80–99.9)
PLATELET # BLD AUTO: 244 K/UL (ref 130–450)
PMV BLD AUTO: 10.4 FL (ref 7–12)
POTASSIUM SERPL-SCNC: 4.8 MMOL/L (ref 3.5–5)
RBC # BLD AUTO: 5.15 M/UL (ref 3.8–5.8)
SODIUM SERPL-SCNC: 139 MMOL/L (ref 132–146)
T4 FREE SERPL-MCNC: 1.8 NG/DL (ref 0.9–1.7)
TRIGL SERPL-MCNC: 91 MG/DL
TSH SERPL DL<=0.05 MIU/L-ACNC: 2.63 UIU/ML (ref 0.27–4.2)
VLDLC SERPL CALC-MCNC: 18 MG/DL
WBC OTHER # BLD: 8.3 K/UL (ref 4.5–11.5)

## 2023-09-19 PROCEDURE — 85027 COMPLETE CBC AUTOMATED: CPT

## 2023-09-19 PROCEDURE — 84443 ASSAY THYROID STIM HORMONE: CPT

## 2023-09-19 PROCEDURE — 84439 ASSAY OF FREE THYROXINE: CPT

## 2023-09-19 PROCEDURE — 83036 HEMOGLOBIN GLYCOSYLATED A1C: CPT

## 2023-09-19 PROCEDURE — 80048 BASIC METABOLIC PNL TOTAL CA: CPT

## 2023-09-19 PROCEDURE — 36415 COLL VENOUS BLD VENIPUNCTURE: CPT

## 2023-09-19 PROCEDURE — 80061 LIPID PANEL: CPT

## 2023-10-04 PROBLEM — J44.1 COPD EXACERBATION (HCC): Status: RESOLVED | Noted: 2022-09-27 | Resolved: 2023-10-04

## 2023-10-04 PROBLEM — J41.1 MUCOPURULENT CHRONIC BRONCHITIS (HCC): Status: ACTIVE | Noted: 2023-10-04

## 2024-03-15 ENCOUNTER — HOSPITAL ENCOUNTER (OUTPATIENT)
Age: 89
Discharge: HOME | End: 2024-03-15
Payer: MEDICARE

## 2024-03-15 DIAGNOSIS — E08.40 DIABETES MELLITUS DUE TO UNDERLYING CONDITION WITH DIABETIC NEUROPATHY, WITHOUT LONG-TERM CURRENT USE OF INSULIN (HCC): ICD-10-CM

## 2024-03-15 DIAGNOSIS — E03.8 OTHER SPECIFIED HYPOTHYROIDISM: ICD-10-CM

## 2024-03-15 LAB
ANION GAP SERPL CALCULATED.3IONS-SCNC: 10 MMOL/L (ref 7–16)
BUN SERPL-MCNC: 19 MG/DL (ref 6–23)
CALCIUM SERPL-MCNC: 9.6 MG/DL (ref 8.6–10.2)
CHLORIDE SERPL-SCNC: 101 MMOL/L (ref 98–107)
CHOLEST SERPL-MCNC: 137 MG/DL
CO2 SERPL-SCNC: 28 MMOL/L (ref 22–29)
CREAT SERPL-MCNC: 1.3 MG/DL (ref 0.7–1.2)
CREAT UR-MCNC: 76.9 MG/DL (ref 40–278)
ERYTHROCYTE [DISTWIDTH] IN BLOOD BY AUTOMATED COUNT: 13.4 % (ref 11.5–15)
GFR SERPL CREATININE-BSD FRML MDRD: 52 ML/MIN/1.73M2
GLUCOSE SERPL-MCNC: 108 MG/DL (ref 74–99)
HBA1C MFR BLD: 7.5 % (ref 4–5.6)
HCT VFR BLD AUTO: 49.7 % (ref 37–54)
HDLC SERPL-MCNC: 54 MG/DL
HGB BLD-MCNC: 15.4 G/DL (ref 12.5–16.5)
LDLC SERPL CALC-MCNC: 61 MG/DL
MCH RBC QN AUTO: 30.1 PG (ref 26–35)
MCHC RBC AUTO-ENTMCNC: 31 G/DL (ref 32–34.5)
MCV RBC AUTO: 97.1 FL (ref 80–99.9)
MICROALBUMIN UR-MCNC: <12 MG/L (ref 0–19)
MICROALBUMIN/CREAT UR-RTO: NORMAL MCG/MG CREAT (ref 0–30)
PLATELET # BLD AUTO: 239 K/UL (ref 130–450)
PMV BLD AUTO: 10.9 FL (ref 7–12)
POTASSIUM SERPL-SCNC: 4.4 MMOL/L (ref 3.5–5)
RBC # BLD AUTO: 5.12 M/UL (ref 3.8–5.8)
SODIUM SERPL-SCNC: 139 MMOL/L (ref 132–146)
T4 FREE SERPL-MCNC: 1.7 NG/DL (ref 0.9–1.7)
TRIGL SERPL-MCNC: 112 MG/DL
TSH SERPL DL<=0.05 MIU/L-ACNC: 2.37 UIU/ML (ref 0.27–4.2)
VLDLC SERPL CALC-MCNC: 22 MG/DL
WBC OTHER # BLD: 9.6 K/UL (ref 4.5–11.5)

## 2024-03-15 PROCEDURE — 82570 ASSAY OF URINE CREATININE: CPT

## 2024-03-15 PROCEDURE — 84439 ASSAY OF FREE THYROXINE: CPT

## 2024-03-15 PROCEDURE — 83036 HEMOGLOBIN GLYCOSYLATED A1C: CPT

## 2024-03-15 PROCEDURE — 80048 BASIC METABOLIC PNL TOTAL CA: CPT

## 2024-03-15 PROCEDURE — 82043 UR ALBUMIN QUANTITATIVE: CPT

## 2024-03-15 PROCEDURE — 84443 ASSAY THYROID STIM HORMONE: CPT

## 2024-03-15 PROCEDURE — 80061 LIPID PANEL: CPT

## 2024-03-15 PROCEDURE — 36415 COLL VENOUS BLD VENIPUNCTURE: CPT

## 2024-03-15 PROCEDURE — 85027 COMPLETE CBC AUTOMATED: CPT

## 2024-04-30 ENCOUNTER — APPOINTMENT (OUTPATIENT)
Dept: CT IMAGING | Age: 89
End: 2024-04-30
Payer: MEDICARE

## 2024-04-30 ENCOUNTER — APPOINTMENT (OUTPATIENT)
Dept: CT IMAGING | Age: 89
End: 2024-04-30
Attending: EMERGENCY MEDICINE
Payer: MEDICARE

## 2024-04-30 ENCOUNTER — HOSPITAL ENCOUNTER (INPATIENT)
Age: 89
LOS: 1 days | Discharge: HOME | End: 2024-05-01
Attending: EMERGENCY MEDICINE | Admitting: SURGERY
Payer: MEDICARE

## 2024-04-30 DIAGNOSIS — S06.5XAA SDH (SUBDURAL HEMATOMA) (HCC): Primary | ICD-10-CM

## 2024-04-30 DIAGNOSIS — S00.83XA CONTUSION OF FACE, INITIAL ENCOUNTER: ICD-10-CM

## 2024-04-30 PROBLEM — Z79.82 ASPIRIN LONG-TERM USE: Status: ACTIVE | Noted: 2024-04-30

## 2024-04-30 LAB
ALBUMIN SERPL-MCNC: 4 G/DL (ref 3.5–5.2)
ALP SERPL-CCNC: 77 U/L (ref 40–129)
ALT SERPL-CCNC: 10 U/L (ref 0–40)
ANION GAP SERPL CALCULATED.3IONS-SCNC: 12 MMOL/L (ref 7–16)
AST SERPL-CCNC: 13 U/L (ref 0–39)
BASOPHILS # BLD: 0.06 K/UL (ref 0–0.2)
BASOPHILS NFR BLD: 1 % (ref 0–2)
BILIRUB SERPL-MCNC: 0.6 MG/DL (ref 0–1.2)
BNP SERPL-MCNC: 79 PG/ML (ref 0–450)
BUN SERPL-MCNC: 24 MG/DL (ref 6–23)
CALCIUM SERPL-MCNC: 10 MG/DL (ref 8.6–10.2)
CHLORIDE SERPL-SCNC: 102 MMOL/L (ref 98–107)
CK SERPL-CCNC: 73 U/L (ref 20–200)
CLOT ANGLE.KAOLIN INDUCED BLD RES TEG: 73.7 DEG (ref 53–70)
CO2 SERPL-SCNC: 26 MMOL/L (ref 22–29)
CREAT SERPL-MCNC: 1.4 MG/DL (ref 0.7–1.2)
EOSINOPHIL # BLD: 0.08 K/UL (ref 0.05–0.5)
EOSINOPHILS RELATIVE PERCENT: 1 % (ref 0–6)
EPL-TEG: 0 % (ref 0–15)
ERYTHROCYTE [DISTWIDTH] IN BLOOD BY AUTOMATED COUNT: 13.2 % (ref 11.5–15)
G-TEG: 13.2 KDYN/CM2 (ref 4.5–11)
GFR SERPL CREATININE-BSD FRML MDRD: 50 ML/MIN/1.73M2
GLUCOSE BLD-MCNC: 86 MG/DL (ref 74–99)
GLUCOSE SERPL-MCNC: 166 MG/DL (ref 74–99)
HCT VFR BLD AUTO: 48.7 % (ref 37–54)
HGB BLD-MCNC: 15.3 G/DL (ref 12.5–16.5)
IMM GRANULOCYTES # BLD AUTO: 0.03 K/UL (ref 0–0.58)
IMM GRANULOCYTES NFR BLD: 0 % (ref 0–5)
KINETICS TEG: 1.1 MIN (ref 1–3)
LACTATE BLDV-SCNC: 1.7 MMOL/L (ref 0.5–2.2)
LIPASE SERPL-CCNC: 20 U/L (ref 13–60)
LY30 (LYSIS) TEG: 0 % (ref 0–8)
LYMPHOCYTES NFR BLD: 1.11 K/UL (ref 1.5–4)
LYMPHOCYTES RELATIVE PERCENT: 11 % (ref 20–42)
MA (MAX CLOT) TEG: 72.6 MM (ref 50–70)
MCH RBC QN AUTO: 30.4 PG (ref 26–35)
MCHC RBC AUTO-ENTMCNC: 31.4 G/DL (ref 32–34.5)
MCV RBC AUTO: 96.6 FL (ref 80–99.9)
MONOCYTES NFR BLD: 0.85 K/UL (ref 0.1–0.95)
MONOCYTES NFR BLD: 8 % (ref 2–12)
NEUTROPHILS NFR BLD: 79 % (ref 43–80)
NEUTS SEG NFR BLD: 8.25 K/UL (ref 1.8–7.3)
PLATELET # BLD AUTO: 229 K/UL (ref 130–450)
PMV BLD AUTO: 10.5 FL (ref 7–12)
POTASSIUM SERPL-SCNC: 4.8 MMOL/L (ref 3.5–5)
PROT SERPL-MCNC: 6.8 G/DL (ref 6.4–8.3)
RBC # BLD AUTO: 5.04 M/UL (ref 3.8–5.8)
REACTION TIME TEG: 4.1 MIN (ref 5–10)
SODIUM SERPL-SCNC: 140 MMOL/L (ref 132–146)
WBC OTHER # BLD: 10.4 K/UL (ref 4.5–11.5)

## 2024-04-30 PROCEDURE — 2580000003 HC RX 258

## 2024-04-30 PROCEDURE — 72125 CT NECK SPINE W/O DYE: CPT

## 2024-04-30 PROCEDURE — 6360000004 HC RX CONTRAST MEDICATION: Performed by: RADIOLOGY

## 2024-04-30 PROCEDURE — 70450 CT HEAD/BRAIN W/O DYE: CPT

## 2024-04-30 PROCEDURE — 6360000002 HC RX W HCPCS: Performed by: EMERGENCY MEDICINE

## 2024-04-30 PROCEDURE — 83605 ASSAY OF LACTIC ACID: CPT

## 2024-04-30 PROCEDURE — 85384 FIBRINOGEN ACTIVITY: CPT

## 2024-04-30 PROCEDURE — 80053 COMPREHEN METABOLIC PANEL: CPT

## 2024-04-30 PROCEDURE — 85347 COAGULATION TIME ACTIVATED: CPT

## 2024-04-30 PROCEDURE — 85576 BLOOD PLATELET AGGREGATION: CPT

## 2024-04-30 PROCEDURE — 83690 ASSAY OF LIPASE: CPT

## 2024-04-30 PROCEDURE — 99285 EMERGENCY DEPT VISIT HI MDM: CPT

## 2024-04-30 PROCEDURE — 70486 CT MAXILLOFACIAL W/O DYE: CPT

## 2024-04-30 PROCEDURE — 85025 COMPLETE CBC W/AUTO DIFF WBC: CPT

## 2024-04-30 PROCEDURE — 96374 THER/PROPH/DIAG INJ IV PUSH: CPT

## 2024-04-30 PROCEDURE — 93005 ELECTROCARDIOGRAM TRACING: CPT | Performed by: EMERGENCY MEDICINE

## 2024-04-30 PROCEDURE — 2580000003 HC RX 258: Performed by: EMERGENCY MEDICINE

## 2024-04-30 PROCEDURE — 82550 ASSAY OF CK (CPK): CPT

## 2024-04-30 PROCEDURE — 82962 GLUCOSE BLOOD TEST: CPT

## 2024-04-30 PROCEDURE — 96375 TX/PRO/DX INJ NEW DRUG ADDON: CPT

## 2024-04-30 PROCEDURE — 6360000002 HC RX W HCPCS

## 2024-04-30 PROCEDURE — 83880 ASSAY OF NATRIURETIC PEPTIDE: CPT

## 2024-04-30 PROCEDURE — 2060000000 HC ICU INTERMEDIATE R&B

## 2024-04-30 PROCEDURE — 85390 FIBRINOLYSINS SCREEN I&R: CPT

## 2024-04-30 PROCEDURE — 74177 CT ABD & PELVIS W/CONTRAST: CPT

## 2024-04-30 PROCEDURE — 99222 1ST HOSP IP/OBS MODERATE 55: CPT | Performed by: NEUROLOGICAL SURGERY

## 2024-04-30 PROCEDURE — 71260 CT THORAX DX C+: CPT

## 2024-04-30 PROCEDURE — 99223 1ST HOSP IP/OBS HIGH 75: CPT | Performed by: SURGERY

## 2024-04-30 RX ORDER — CHOLECALCIFEROL (VITAMIN D3) 50 MCG
2000 TABLET ORAL DAILY
Status: DISCONTINUED | OUTPATIENT
Start: 2024-04-30 | End: 2024-05-01 | Stop reason: HOSPADM

## 2024-04-30 RX ORDER — INSULIN LISPRO 100 [IU]/ML
0-4 INJECTION, SOLUTION INTRAVENOUS; SUBCUTANEOUS NIGHTLY
Status: DISCONTINUED | OUTPATIENT
Start: 2024-05-01 | End: 2024-05-01 | Stop reason: HOSPADM

## 2024-04-30 RX ORDER — INSULIN LISPRO 100 [IU]/ML
0-4 INJECTION, SOLUTION INTRAVENOUS; SUBCUTANEOUS
Status: DISCONTINUED | OUTPATIENT
Start: 2024-05-01 | End: 2024-05-01 | Stop reason: HOSPADM

## 2024-04-30 RX ORDER — ONDANSETRON 4 MG/1
4 TABLET, ORALLY DISINTEGRATING ORAL EVERY 8 HOURS PRN
Status: DISCONTINUED | OUTPATIENT
Start: 2024-04-30 | End: 2024-05-01 | Stop reason: HOSPADM

## 2024-04-30 RX ORDER — SODIUM CHLORIDE 9 MG/ML
INJECTION, SOLUTION INTRAVENOUS PRN
Status: DISCONTINUED | OUTPATIENT
Start: 2024-04-30 | End: 2024-05-01 | Stop reason: HOSPADM

## 2024-04-30 RX ORDER — LEVOTHYROXINE SODIUM 0.07 MG/1
75 TABLET ORAL DAILY
Status: DISCONTINUED | OUTPATIENT
Start: 2024-05-01 | End: 2024-05-01 | Stop reason: HOSPADM

## 2024-04-30 RX ORDER — FINASTERIDE 5 MG/1
5 TABLET, FILM COATED ORAL EVERY MORNING
Status: DISCONTINUED | OUTPATIENT
Start: 2024-05-01 | End: 2024-05-01 | Stop reason: HOSPADM

## 2024-04-30 RX ORDER — LEVETIRACETAM 500 MG/5ML
500 INJECTION, SOLUTION, CONCENTRATE INTRAVENOUS EVERY 12 HOURS
Status: DISCONTINUED | OUTPATIENT
Start: 2024-04-30 | End: 2024-05-01

## 2024-04-30 RX ORDER — SODIUM CHLORIDE 0.9 % (FLUSH) 0.9 %
10 SYRINGE (ML) INJECTION EVERY 12 HOURS SCHEDULED
Status: DISCONTINUED | OUTPATIENT
Start: 2024-05-01 | End: 2024-05-01 | Stop reason: HOSPADM

## 2024-04-30 RX ORDER — GLIMEPIRIDE 2 MG/1
2 TABLET ORAL
COMMUNITY

## 2024-04-30 RX ORDER — 0.9 % SODIUM CHLORIDE 0.9 %
1000 INTRAVENOUS SOLUTION INTRAVENOUS ONCE
Status: COMPLETED | OUTPATIENT
Start: 2024-04-30 | End: 2024-04-30

## 2024-04-30 RX ORDER — ONDANSETRON 2 MG/ML
4 INJECTION INTRAMUSCULAR; INTRAVENOUS EVERY 6 HOURS PRN
Status: DISCONTINUED | OUTPATIENT
Start: 2024-04-30 | End: 2024-05-01 | Stop reason: HOSPADM

## 2024-04-30 RX ORDER — ONDANSETRON 2 MG/ML
4 INJECTION INTRAMUSCULAR; INTRAVENOUS ONCE
Status: COMPLETED | OUTPATIENT
Start: 2024-04-30 | End: 2024-04-30

## 2024-04-30 RX ORDER — ROSUVASTATIN CALCIUM 10 MG/1
10 TABLET, COATED ORAL DAILY
Status: DISCONTINUED | OUTPATIENT
Start: 2024-04-30 | End: 2024-05-01 | Stop reason: HOSPADM

## 2024-04-30 RX ORDER — SODIUM CHLORIDE 9 MG/ML
INJECTION, SOLUTION INTRAVENOUS CONTINUOUS
Status: DISCONTINUED | OUTPATIENT
Start: 2024-04-30 | End: 2024-05-01

## 2024-04-30 RX ORDER — ALBUTEROL SULFATE 2.5 MG/3ML
2.5 SOLUTION RESPIRATORY (INHALATION)
Status: DISCONTINUED | OUTPATIENT
Start: 2024-04-30 | End: 2024-05-01 | Stop reason: HOSPADM

## 2024-04-30 RX ORDER — SODIUM CHLORIDE 0.9 % (FLUSH) 0.9 %
10 SYRINGE (ML) INJECTION PRN
Status: DISCONTINUED | OUTPATIENT
Start: 2024-04-30 | End: 2024-05-01 | Stop reason: HOSPADM

## 2024-04-30 RX ORDER — ACETAMINOPHEN 325 MG/1
650 TABLET ORAL EVERY 4 HOURS PRN
Status: DISCONTINUED | OUTPATIENT
Start: 2024-04-30 | End: 2024-05-01 | Stop reason: HOSPADM

## 2024-04-30 RX ORDER — POLYETHYLENE GLYCOL 3350 17 G/17G
17 POWDER, FOR SOLUTION ORAL DAILY
Status: DISCONTINUED | OUTPATIENT
Start: 2024-05-01 | End: 2024-05-01 | Stop reason: HOSPADM

## 2024-04-30 RX ADMIN — ONDANSETRON 4 MG: 2 INJECTION INTRAMUSCULAR; INTRAVENOUS at 12:31

## 2024-04-30 RX ADMIN — LEVETIRACETAM 500 MG: 100 INJECTION INTRAVENOUS at 16:47

## 2024-04-30 RX ADMIN — SODIUM CHLORIDE: 9 INJECTION, SOLUTION INTRAVENOUS at 23:39

## 2024-04-30 RX ADMIN — SODIUM CHLORIDE 1000 ML: 9 INJECTION, SOLUTION INTRAVENOUS at 12:30

## 2024-04-30 RX ADMIN — IOPAMIDOL 75 ML: 755 INJECTION, SOLUTION INTRAVENOUS at 15:27

## 2024-04-30 RX ADMIN — SODIUM CHLORIDE: 9 INJECTION, SOLUTION INTRAVENOUS at 16:47

## 2024-04-30 ASSESSMENT — LIFESTYLE VARIABLES
HOW OFTEN DO YOU HAVE A DRINK CONTAINING ALCOHOL: NEVER
HOW MANY STANDARD DRINKS CONTAINING ALCOHOL DO YOU HAVE ON A TYPICAL DAY: PATIENT DOES NOT DRINK

## 2024-04-30 NOTE — ED NOTES
Radiology Procedure Waiver   Name: Chan Xiong  : 1934  MRN: [id number]    Date:  24    Time: 11:02 AM EDT    Benefits of immediately proceeding with Radiology exam(s) without pre-testing outweigh the risks or are not indicated as specified below and therefore the following is/are being waived:    [] Pregnancy test   [] Patients LMP on-time and regular.   [] Patient had Tubal Ligation or has other Contraception Device.   [] Patient  is Menopausal or Premenarcheal.    [] Patient had Full or Partial Hysterectomy.    [] Protocol for Iodine allergy    [] MRI Questionnaire     [x] BUN/Creatinine   [] Patient age w/no hx of renal dysfunction.   [] Patient on Dialysis.   [] Recent Normal Labs.  Electronically signed by Cielo Slater MD on 24 at 11:02 AM EDT          Emergent       Cielo Slater MD  24 [phone]

## 2024-04-30 NOTE — H&P
TRAUMA HISTORY & PHYSICAL  Attending/Surgical Resident/Advance Practice Nurse  4/30/2024  5:41 PM    PRIMARY SURVEY    CHIEF COMPLAINT:  Trauma consult.    Patient is an 89-year-old male with history of DM, CAD, DM, hypothyroidism, COPD presenting to the ED after a fall in his home.  Unwitnessed fall, found on the ground alert, unknown loss of consciousness.  Patient's niece at bedside reporting all history.  Patient is extremely hard of hearing and refuses to wear hearing aids.  Patient was not using his walker and fell to the ground and hit his face.  Niece reports that he lives at home with her daughter.  She denies that he has any baseline dementia although he is extremely hard of hearing so she says this puts him in \"in a bubble\".  Patient has obvious facial ecchymosis and swelling, no other external signs of trauma.  When asked if patient is in pain he replies no.  Able to follow commands, but is not oriented to time.  GCS 14, 1 off for confusion.  Only blood thinner is ASA 81 mg daily.  History of left foot drop from gallbladder surgery per patient's niece.     CODE STATUS was discussed.  Patient is a DNR CCA at this time.  When niece, who is POA, asked if he was a DNR CC she was unaware if he was this or not.  She stated that she would go home to get the paperwork.    AIRWAY:   Airway Normal    EMS ETT Absent  Noisy respirations Absent  Retractions: Absent  Vomiting/bleeding: Absent    BREATHING:    Midaxillary breath sound left:  Normal  Midaxillary breath sound right:  Normal    Cough sound intensity:  good    FiO2:  Room air    SMI unable to follow instructions.     CIRCULATION:   Femerol pulse intensity: Strong  Palpebral conjunctiva: Red    Vitals:    04/30/24 1658   BP:    Pulse:    Resp:    Temp:    SpO2: 95%       Vitals:    04/30/24 1012 04/30/24 1615 04/30/24 1616 04/30/24 1658   BP: 126/61  117/65    Pulse:  71     Resp:  12     Temp:       TempSrc:       SpO2:  94% 95% 95%        FAST EXAM:   No blood noted.      Perineum: Atraumatic.  No blood or urine noted.      Extremities:   Sensory normal  Motor abnormal: chronic L foot drop    Distal Pulses  Left arm normal  Right arm normal  Left leg normal  Right leg normal    Capillary refill  Left arm normal  Right arm normal  Left leg normal  Right leg normal    Procedures in ED:      In the event of Emergency Blood Transfusion:  Due to the critical condition of this patient, I request the immediate release of blood products for emergency transfusion secondary to shock. I understand the increased risks incurred by the lack of complete transfusion testing.      Radiology: CT Head , CT Face , CT Cervical spine , CT Chest , and CT Abdomen      Consultations: Neurosurgery, Palliative    Admission/Diagnosis: Fall, SDH      Plan of Treatment:  - SDH 3mm; will repeat CT head at 17:00, no AC/PC, ASA held, Neurosurgery consulted, appreciate their recommendations, Keppra 500 BID  - likely has underlying dementia; may benefit from cognitive eval  - Facial trauma; CT face without acute fx, supportive care  - PT/OT  - Per POA, patient's Niece, she is unsure if patient is DNR CCA or DNR CC and will find the paperwork; would like to talk to palliative about code status    Plan discussed with Dr. PAVEL Gamble at 4/30/2024 on 5:41 PM    Electronically signed by Rachael Issa DO on 4/30/2024 at 5:41 PM

## 2024-04-30 NOTE — ED PROVIDER NOTES
HPI:  4/30/24, Time: 11:02 AM EDT         Chan Xiong is a 89 y.o. male presenting to the ED for unwitnessed fall at home yesterday.  Patient has some baseline confusion so his niece is at bedside providing history.  Patient lives at home with a nieces daughter.  Unknown downtime.  Patient with ecchymosis to his face.  Patient had 2 episodes of nonbloody, nonbilious emesis today.  Otherwise acting at baseline.  Patient has difficulty describing where his pain is.  He is on aspirin but no other anticoagulation.    --------------------------------------------- PAST HISTORY ---------------------------------------------  Past Medical History:  has a past medical history of Arthritis, COPD (chronic obstructive pulmonary disease) ([facility]), Diabetes mellitus (HCC), Hearing aid worn, Hyperlipidemia, and Hypothyroidism.    Past Surgical History:  has a past surgical history that includes Colonoscopy ([id number]); Cholecystectomy; hernia repair; Colonoscopy (4/4/2016); Upper gastrointestinal endoscopy (05/23/2017); and Endoscopy, colon, diagnostic.    Social History:  reports that he has never smoked. He has never used smokeless tobacco. He reports that he does not drink alcohol and does not use drugs.    Family History: family history includes Cancer in his father; Heart Disease in his brother; Heart Failure in his mother and sister.     The patient’s home medications have been reviewed.    Allergies: Patient has no known allergies.    -------------------------------------------------- RESULTS -------------------------------------------------  All laboratory and radiology results have been personally reviewed by myself   LABS:  Results for orders placed or performed during the hospital encounter of 04/30/24   CK   Result Value Ref Range    Total CK 73 20 - 200 U/L   CBC with Auto Differential   Result Value Ref Range    WBC 10.4 4.5 - 11.5 k/uL    RBC 5.04 3.80 - 5.80 m/uL    Hemoglobin 15.3 12.5 - 16.5 g/dL    Hematocrit  transcription errors may be present    ICielo MD, am the primary provider of this record        Cielo Slater MD  04/30/24 1837

## 2024-05-01 ENCOUNTER — APPOINTMENT (OUTPATIENT)
Dept: CT IMAGING | Age: 89
End: 2024-05-01
Payer: MEDICARE

## 2024-05-01 VITALS
DIASTOLIC BLOOD PRESSURE: 56 MMHG | TEMPERATURE: 98 F | SYSTOLIC BLOOD PRESSURE: 110 MMHG | RESPIRATION RATE: 20 BRPM | OXYGEN SATURATION: 92 % | HEART RATE: 76 BPM

## 2024-05-01 DIAGNOSIS — S06.5XAA SDH (SUBDURAL HEMATOMA) (HCC): Primary | ICD-10-CM

## 2024-05-01 LAB
AMPHET UR QL SCN: NEGATIVE
ANION GAP SERPL CALCULATED.3IONS-SCNC: 12 MMOL/L (ref 7–16)
ANION GAP SERPL CALCULATED.3IONS-SCNC: 15 MMOL/L (ref 7–16)
BARBITURATES UR QL SCN: NEGATIVE
BASOPHILS # BLD: 0.06 K/UL (ref 0–0.2)
BASOPHILS NFR BLD: 1 % (ref 0–2)
BENZODIAZ UR QL: NEGATIVE
BUN SERPL-MCNC: 17 MG/DL (ref 6–23)
BUN SERPL-MCNC: 18 MG/DL (ref 6–23)
BUPRENORPHINE UR QL: NEGATIVE
CALCIUM SERPL-MCNC: 9.1 MG/DL (ref 8.6–10.2)
CALCIUM SERPL-MCNC: 9.3 MG/DL (ref 8.6–10.2)
CANNABINOIDS UR QL SCN: NEGATIVE
CHLORIDE SERPL-SCNC: 101 MMOL/L (ref 98–107)
CHLORIDE SERPL-SCNC: 107 MMOL/L (ref 98–107)
CLOT ANGLE.KAOLIN INDUCED BLD RES TEG: 72.5 DEG (ref 53–70)
CO2 SERPL-SCNC: 20 MMOL/L (ref 22–29)
CO2 SERPL-SCNC: 22 MMOL/L (ref 22–29)
COCAINE UR QL SCN: NEGATIVE
CREAT SERPL-MCNC: 1.2 MG/DL (ref 0.7–1.2)
CREAT SERPL-MCNC: 1.2 MG/DL (ref 0.7–1.2)
EOSINOPHIL # BLD: 0.22 K/UL (ref 0.05–0.5)
EOSINOPHILS RELATIVE PERCENT: 2 % (ref 0–6)
EPL-TEG: 0.2 % (ref 0–15)
ERYTHROCYTE [DISTWIDTH] IN BLOOD BY AUTOMATED COUNT: 13.2 % (ref 11.5–15)
ETHANOLAMINE SERPL-MCNC: <10 MG/DL
FENTANYL UR QL: NEGATIVE
G-TEG: 14.3 KDYN/CM2 (ref 4.5–11)
GFR, ESTIMATED: 58 ML/MIN/1.73M2
GFR, ESTIMATED: 60 ML/MIN/1.73M2
GLUCOSE BLD-MCNC: 73 MG/DL (ref 74–99)
GLUCOSE BLD-MCNC: 81 MG/DL (ref 74–99)
GLUCOSE SERPL-MCNC: 62 MG/DL (ref 74–99)
GLUCOSE SERPL-MCNC: 78 MG/DL (ref 74–99)
HCT VFR BLD AUTO: 48.6 % (ref 37–54)
HGB BLD-MCNC: 14.9 G/DL (ref 12.5–16.5)
IMM GRANULOCYTES # BLD AUTO: 0.04 K/UL (ref 0–0.58)
IMM GRANULOCYTES NFR BLD: 0 % (ref 0–5)
KINETICS TEG: 1.2 MIN (ref 1–3)
LY30 (LYSIS) TEG: 0.2 % (ref 0–8)
LYMPHOCYTES NFR BLD: 0.86 K/UL (ref 1.5–4)
LYMPHOCYTES RELATIVE PERCENT: 7 % (ref 20–42)
MA (MAX CLOT) TEG: 74.1 MM (ref 50–70)
MCH RBC QN AUTO: 30.6 PG (ref 26–35)
MCHC RBC AUTO-ENTMCNC: 30.7 G/DL (ref 32–34.5)
MCV RBC AUTO: 99.8 FL (ref 80–99.9)
METHADONE UR QL: NEGATIVE
MONOCYTES NFR BLD: 1.16 K/UL (ref 0.1–0.95)
MONOCYTES NFR BLD: 10 % (ref 2–12)
NEUTROPHILS NFR BLD: 81 % (ref 43–80)
NEUTS SEG NFR BLD: 9.71 K/UL (ref 1.8–7.3)
OPIATES UR QL SCN: NEGATIVE
OXYCODONE UR QL SCN: NEGATIVE
PCP UR QL SCN: NEGATIVE
PLATELET # BLD AUTO: 191 K/UL (ref 130–450)
PMV BLD AUTO: 10.7 FL (ref 7–12)
POTASSIUM SERPL-SCNC: 5.2 MMOL/L (ref 3.5–5)
POTASSIUM SERPL-SCNC: 5.4 MMOL/L (ref 3.5–5)
RBC # BLD AUTO: 4.87 M/UL (ref 3.8–5.8)
REACTION TIME TEG: 5.9 MIN (ref 5–10)
SODIUM SERPL-SCNC: 136 MMOL/L (ref 132–146)
SODIUM SERPL-SCNC: 141 MMOL/L (ref 132–146)
TEST INFORMATION: NORMAL
WBC OTHER # BLD: 12.1 K/UL (ref 4.5–11.5)

## 2024-05-01 PROCEDURE — 80048 BASIC METABOLIC PNL TOTAL CA: CPT

## 2024-05-01 PROCEDURE — 99238 HOSP IP/OBS DSCHRG MGMT 30/<: CPT | Performed by: SURGERY

## 2024-05-01 PROCEDURE — 92610 EVALUATE SWALLOWING FUNCTION: CPT | Performed by: SPEECH-LANGUAGE PATHOLOGIST

## 2024-05-01 PROCEDURE — 36415 COLL VENOUS BLD VENIPUNCTURE: CPT

## 2024-05-01 PROCEDURE — 85347 COAGULATION TIME ACTIVATED: CPT

## 2024-05-01 PROCEDURE — 94640 AIRWAY INHALATION TREATMENT: CPT

## 2024-05-01 PROCEDURE — 85576 BLOOD PLATELET AGGREGATION: CPT

## 2024-05-01 PROCEDURE — G0480 DRUG TEST DEF 1-7 CLASSES: HCPCS

## 2024-05-01 PROCEDURE — 85025 COMPLETE CBC W/AUTO DIFF WBC: CPT

## 2024-05-01 PROCEDURE — 85390 FIBRINOLYSINS SCREEN I&R: CPT

## 2024-05-01 PROCEDURE — 85384 FIBRINOGEN ACTIVITY: CPT

## 2024-05-01 PROCEDURE — 82962 GLUCOSE BLOOD TEST: CPT

## 2024-05-01 PROCEDURE — 6360000002 HC RX W HCPCS

## 2024-05-01 PROCEDURE — 97166 OT EVAL MOD COMPLEX 45 MIN: CPT

## 2024-05-01 PROCEDURE — 97161 PT EVAL LOW COMPLEX 20 MIN: CPT

## 2024-05-01 PROCEDURE — 70450 CT HEAD/BRAIN W/O DYE: CPT

## 2024-05-01 PROCEDURE — 80307 DRUG TEST PRSMV CHEM ANLYZR: CPT

## 2024-05-01 PROCEDURE — 2700000000 HC OXYGEN THERAPY PER DAY

## 2024-05-01 PROCEDURE — 92526 ORAL FUNCTION THERAPY: CPT | Performed by: SPEECH-LANGUAGE PATHOLOGIST

## 2024-05-01 PROCEDURE — 97530 THERAPEUTIC ACTIVITIES: CPT

## 2024-05-01 RX ORDER — LEVETIRACETAM 500 MG/1
500 TABLET ORAL 2 TIMES DAILY
Qty: 12 TABLET | Refills: 0 | Status: SHIPPED | OUTPATIENT
Start: 2024-05-01 | End: 2024-05-07

## 2024-05-01 RX ORDER — POLYETHYLENE GLYCOL 3350 17 G/17G
17 POWDER, FOR SOLUTION ORAL DAILY
Qty: 30 PACKET | Refills: 0 | Status: SHIPPED | OUTPATIENT
Start: 2024-05-02 | End: 2024-06-01

## 2024-05-01 RX ORDER — LABETALOL HYDROCHLORIDE 5 MG/ML
10 INJECTION, SOLUTION INTRAVENOUS
Status: DISCONTINUED | OUTPATIENT
Start: 2024-05-01 | End: 2024-05-01 | Stop reason: HOSPADM

## 2024-05-01 RX ORDER — LEVETIRACETAM 500 MG/1
500 TABLET ORAL 2 TIMES DAILY
Status: DISCONTINUED | OUTPATIENT
Start: 2024-05-01 | End: 2024-05-01 | Stop reason: HOSPADM

## 2024-05-01 RX ORDER — HYDRALAZINE HYDROCHLORIDE 20 MG/ML
10 INJECTION INTRAMUSCULAR; INTRAVENOUS EVERY 30 MIN PRN
Status: DISCONTINUED | OUTPATIENT
Start: 2024-05-01 | End: 2024-05-01 | Stop reason: HOSPADM

## 2024-05-01 RX ORDER — FINASTERIDE 5 MG/1
5 TABLET, FILM COATED ORAL EVERY MORNING
Qty: 30 TABLET | Refills: 0 | Status: SHIPPED | OUTPATIENT
Start: 2024-05-01

## 2024-05-01 RX ADMIN — ALBUTEROL SULFATE 2.5 MG: 2.5 SOLUTION RESPIRATORY (INHALATION) at 12:39

## 2024-05-01 RX ADMIN — LEVETIRACETAM 500 MG: 100 INJECTION INTRAVENOUS at 04:22

## 2024-05-01 ASSESSMENT — ENCOUNTER SYMPTOMS
BLOOD IN STOOL: 0
SHORTNESS OF BREATH: 0
GASTROINTESTINAL NEGATIVE: 1
CONSTIPATION: 0
BACK PAIN: 0
ALLERGIC/IMMUNOLOGIC NEGATIVE: 1
ABDOMINAL PAIN: 0
NAUSEA: 0
DIARRHEA: 0
EYES NEGATIVE: 1
ABDOMINAL DISTENTION: 0
VOMITING: 0
COUGH: 1
ANAL BLEEDING: 0

## 2024-05-01 NOTE — ACP (ADVANCE CARE PLANNING)
Advance Care Planning   Healthcare Decision Maker:    Primary Decision Maker: Nela Mckeon([facility]) - Other - [phone]    Click here to complete Healthcare Decision Makers including selection of the Healthcare Decision Maker Relationship (ie \"Primary\").

## 2024-05-01 NOTE — CARE COORDINATION
Chart reviewed and case reviewed in IDR.  Patient admitted after a fall at home.  Patient found to have Left-sided subdural hematoma, stable on repeat CT head.  Therapy worked with patient, and patient requiring moderate assistance.  Met with the patient, his niece Nela Rao and great niece Stephanie at the bedside to discuss transition of care planning.  Patient lives in a one story home with three step entry with bilateral rails that he can reach.  Patient has a rollator, bed rail, Lift chair, grab bars at the toilet and in the shower, BSC, Urinals, and a nebulizer at home.  Patient is currently on 2L O2 per NC and does not wear oxygen at home.  Patient's PCP is Dr Austin and he uses [facility] on [address] for his medications.  Discussed transition of care planning.  Reviewed options for ARU, SARAY, vs home with [facility].  Family would prefer for the patient to return home with [facility].  Patient has a history of rehab at [facility] and [facility] with [facility].  After Discussing HC provider options, Nela Rao would like 1.) [facility] or 2.) [facility].  Call placed to [facility], liaison with [facility] and referral made for transition of care planning.  Perfect serve message sent to Dr Ruano and orders received for [facility].  Await return call with acceptance.  Will continue to follow for further transition of care planning needs.       Jigna Sahu RN.  P:  [phone]      Return call received from Dolores at [facility].  They are unable to accept at this time.  Call placed to Jasmine at [facility] and referral made.  She will review and let this CM know if they can accept.  Will continue to follow.      Jigna Sahu RN.      Case Management Assessment  Initial Evaluation    Date/Time of Evaluation: 5/1/2024 12:01 PM  Assessment Completed by: Jigna Sahu RN    If patient is discharged prior to next notation, then this note serves as note for discharge by case management.    Patient Name: Chan Xiong                    YOB: 1934  Diagnosis: Subdural hematoma (HCC) [S06.5XAA]  SDH (subdural hematoma) (HCC) [S06.5XAA]  Contusion of face, initial encounter [S00.83XA]                   Date / Time: 4/30/2024 10:12 AM    Patient Admission Status: Inpatient   Readmission Risk (Low < 19, Mod (19-27), High > 27): Readmission Risk Score: 11.1    Current PCP: Luc Teresa, DO  PCP verified by CM? Yes (Dr Austin)    Chart Reviewed: Yes      History Provided by: Other (see comment) (KALEB Haley Nela Rao)  Patient Orientation: Person    Patient Cognition: Short Term Memory Deficit ([place])    Hospitalization in the last 30 days (Readmission):  No    If yes, Readmission Assessment in CM Navigator will be completed.    Advance Directives:      Code Status: DNR-CCA   Patient's Primary Decision Maker is: Named in Scanned ACP Document    Primary Decision Maker: Nela Mckeon([facility]) - Other - [phone]    Discharge Planning:    Patient lives with: Family Members Type of Home: House  Primary Care Giver: Family  Patient Support Systems include: Family Members   Current Financial resources:    Current community resources:    Current services prior to admission: None            Current DME:              Type of Home Care services:  None    ADLS  Prior functional level: Assistance with the following:, Bathing, Dressing, Housework, Shopping, Mobility, Cooking  Current functional level: Assistance with the following:, Bathing, Dressing, Cooking, Housework, Shopping, Mobility    PT AM-PAC: 12 /24  OT AM-PAC: 8 /24    Family can provide assistance at DC: Yes  Would you like Case Management to discuss the discharge plan with any other family members/significant others, and if so, who? Yes ([place]ie[place].)  Plans to Return to Present Housing: Yes  Other Identified Issues/Barriers to RETURNING to current housing: n/a  Potential Assistance needed at discharge: Home Care            Potential DME:    Patient expects to discharge to: House  Plan for

## 2024-05-01 NOTE — CONSULTS
Positive for thyroid disorder and diabetes.    PHYSICAL EXAMINATION:  VITAL SIGNS:  He is currently afebrile with a T-current of 36.5 degrees Celsius, pulse 82, blood pressure is 132/62.  GENERAL:  He is resting in bed.  Does not appear to be in any acute distress, appears his stated age.  HEENT:  His head is normocephalic.  There is evidence of trauma as manifested by facial abrasion.  He has no drainage out of his eyes, ears, nose, or throat.  SKIN:  Warm and dry.  MUSCULOSKELETAL:  He has good range of motion in his bilateral upper and lower extremities.  ABDOMEN:  Soft, nontender, and nondistended.  RESPIRATORY:  He is not using any accessory muscles of respiration.  NEUROLOGIC:  Rest of his neurologic exam, he is awake, alert, oriented to person and disoriented to place and time.  Cranial nerves 2 through 12 are intact bilaterally.  Motor exam reveals 4+/5 strength in his bilateral upper and lower extremities.  Sensation is grossly intact to light touch.  Reflexes are 1+ and symmetric.  Toes are going down.    LABORATORY VALUES:  He has sodium of 140, potassium 4.8, BUN is 24, creatinine 1.4.    IMAGING:  On review of imaging, CT scan of his head shows left-sided subdural hematoma.    ASSESSMENT AND PLAN:  The patient is an 89-year-old gentleman with left-sided subdural hematoma.  He is neurologically stable.  We will manage this with serial imaging, serial neurologic exams.  He will be placed on Keppra 500 mg b.i.d.          BRIT MCGARRY MD      D:  04/30/2024 20:39:33     T:  05/01/2024 00:43:32     KU/AQS  Job #:  [id number]     Doc#:  [id number]

## 2024-05-01 NOTE — DISCHARGE INSTRUCTIONS
TRAUMA SERVICES DISCHARGE INSTRUCTIONS    Call [phone], option 2, for any questions/concerns and for follow-up appointment in 2 week(s).    Please follow the instructions checked below:    During the course of your workup, we identified an incidental finding of:  RLL peribronchial thickening  Please follow-up with your primary care provider.    ACTIVITY INSTRUCTIONS  Increase activity as tolerated  No heavy lifting or strenuous activity  Take your incentive spirometer home and use 4-6 times/day   [x]  No driving until cleared by neurosurgery    WOUND/DRESSING INSTRUCTIONS:  You may shower.  No sitting in bath tub, hot tub or swimming until cleared by physician.  Ice to areas of pain for first 24 hours.  Heat to areas of pain after that.  Wash areas of lacerations/abrasions with soap & water.  Rinse well.  Pat dry with clean towel.  Apply thin layer of Bacitracin, Neosporin, or triple antibiotic cream to affected area 2-3 times per day.  Keep wounds clean and dry.   []  Sutures/Staples are to be removed in  week(s).    MEDICATION INSTRUCTIONS  Take medication as prescribed.  When taking pain medications, you may experience dizziness or drowsiness.  Do not drink alcohol or drive when taking these medications.  You may experience constipation while taking pain medication.  You may take over the counter stool softeners such as docusate (Colace), sennosides S (Senokot-S), or Miralax.   []  You may take Ibuprofen (over the counter) as directed for mild pain.     --You may take up to 800mg every 8 hours for pain, please take with food or milk.   [x]  You may take acetaminophen (Tylenol) products.  Do NOT take more than 4000mg of Tylenol in 24h.   []  Do not take any other acetaminophen (Tylenol) products if you are taking Percocet or Norco, as these contain Tylenol.   --Do NOT take more than 4000mg of Tylenol in 24h.    OPIOID MEDICATION INSTRUCTIONS  Read the medication guide that is included with your  Clinic: [phone]--press option 2  [facility]  [address] [place] [address]  [id number]                Subdural Hematoma: Care Instructions  Overview  A subdural hematoma is a buildup of blood between the layers of tissue that cover the brain. The blood collects under the layer closest to the skull. (This layer is called the dura.) The bleeding is most often caused by a head injury, but there can be other causes. In an older adult, even a minor injury can lead to a subdural hematoma.  The buildup of blood inside the skull can put pressure on the brain. This may cause symptoms, such as a severe headache, confusion, or seizures.  There are two kinds of hematomas: acute and chronic.  With an acute hematoma, symptoms start soon after the injury.  With a chronic hematoma, it may be days or weeks before symptoms appear.  Doctors use imaging tests to find the buildup of blood. You may have a test such as a CT scan or MRI. The doctor may also do a test to check the pressure inside your skull.  Bleeding inside the skull may get worse over time. So it is very important to pay attention to your symptoms. And be sure to see your doctor for follow-up testing.  In some cases, treatment is needed to stop the bleeding or remove the blood. This helps relieve the pressure on the brain. You may have a procedure or surgery. The surgery may make small holes in the skull or remove part of the skull.  You may not need treatment if you have a small hematoma that is not causing symptoms.  If you take aspirin or some other blood thinner, you may need to stop taking it. The doctor may give you treatment to undo the effects of the blood thinner. This can help prevent more bleeding in the skull.  Follow-up care is a key part of your treatment and safety. Be sure to make and go to all appointments, and call your doctor if you are having problems. It's also a good idea to know your test results and keep a list of

## 2024-05-01 NOTE — PROGRESS NOTES
PCP is Luc Teresa,   Office notified of admission.      Electronically signed by Frances Almeida RN on 5/1/2024 at 10:59 AM      
  SPEECH/LANGUAGE PATHOLOGY  CLINICAL ASSESSMENT OF SWALLOWING FUNCTION   and PLAN OF CARE    PATIENT NAME:  Chan Xiong  (male)     MRN:  [id number]    :  1934  (89 y.o.)  STATUS:  Inpatient: Room 8518/8518-A    TODAY'S DATE:  24    SLP eval and treat  Start:  24,   End:  24,   ONE TIME,   Standing Count:  1 Occurrences,   R       Milton Gamble MD  REASON FOR REFERRAL: failed bed side swallow   EVALUATING THERAPIST: NORMAN Simpson                 RESULTS:    DYSPHAGIA DIAGNOSIS:   Clinical indicators of limited intake on exam, not able to determine type or severity of dysphagia       DIET RECOMMENDATIONS:  at discretion of physician, SLP unable to fully assess swallow function secondary to weak cough at baseline with minimal PO intake secondary to fatigue    Family member at bedside reports Pt has history of dysphagia and requires the use of a chin tuck that he does not typically follow.     If within goals of care of Pt, MBSS IS RECOMMENDED AND REQUIRES A PHYSICIAN ORDER      FEEDING RECOMMENDATIONS:     Assistance level:  Full assistance is needed during all oral intake      Compensatory strategies recommended: unavailable secondary to limited assessment       Discussed recommendations with:  charge nurse in person and case management     SPEECH THERAPY  PLAN OF CARE   The dysphagia POC is established based on physician order, dysphagia diagnosis and results of clinical assessment     Skilled SLP intervention for dysphagia management on acute care up to 5 x per week until goals met, pt plateaus in function and/or discharged from hospital  Will establish POC once MBSS is completed.    Conditions Requiring Skilled Therapeutic Intervention for dysphagia:    Patient is performing below functional baseline d/t  current acute condition, respiratory compromise, multiple medications, and/or increased dependency upon caregivers.  Coughing during PO intake  
4 Eyes Skin Assessment     NAME:  Chan Xiong  YOB: 1934  MEDICAL RECORD NUMBER:  [id number]    The patient is being assessed for  Admission    I agree that at least one RN has performed a thorough Head to Toe Skin Assessment on the patient. ALL assessment sites listed below have been assessed.      Areas assessed by both nurses:    Head, Face, Ears, Shoulders, Back, Chest, Arms, Elbows, Hands, Sacrum. Buttock, Coccyx, Ischium, Legs. Feet and Heels, and Under Medical Devices         Left facial bruising/swelling, BUE/BLE redness  Does the Patient have a Wound? No noted wound(s)       Migel Prevention initiated by RN: Yes  Wound Care Orders initiated by RN: No    Pressure Injury (Stage 3,4, Unstageable, DTI, NWPT, and Complex wounds) if present, place Wound referral order by RN under : No    New Ostomies, if present place, Ostomy referral order under : No     Nurse 1 eSignature: Electronically signed by Amanda Wray RN on 4/30/24 at 11:59 PM EDT    **SHARE this note so that the co-signing nurse can place an eSignature**    Nurse 2 eSignature: {Esignature:[id number]}   
Department of Neurosurgery  Progress Note    CHIEF COMPLAINT: pt seen for SDH    SUBJECTIVE:  denies headache    REVIEW OF SYSTEMS :  Constitutional: Negative for chills and fever.    Neurological: Negative for dizziness, tremors and speech change.     OBJECTIVE:   VITALS:  BP (!) 104/54   Pulse 68   Temp 97.6 °F (36.4 °C) (Temporal)   Resp 18   SpO2 100%   PHYSICAL:  CONSTITUTIONAL:  awake, alert, cooperative, no apparent distress, and appears stated age and FC, CUMMINGS    DATA:  CBC:   Lab Results   Component Value Date/Time    WBC 12.1 05/01/2024 05:35 AM    RBC 4.87 05/01/2024 05:35 AM    HGB 14.9 05/01/2024 05:35 AM    HCT 48.6 05/01/2024 05:35 AM    MCV 99.8 05/01/2024 05:35 AM    MCH 30.6 05/01/2024 05:35 AM    MCHC 30.7 05/01/2024 05:35 AM    RDW 13.2 05/01/2024 05:35 AM     05/01/2024 05:35 AM    MPV 10.7 05/01/2024 05:35 AM     BMP:    Lab Results   Component Value Date/Time     04/30/2024 12:32 PM    K 4.8 04/30/2024 12:32 PM    K 4.9 01/14/2020 04:29 AM     04/30/2024 12:32 PM    CO2 26 04/30/2024 12:32 PM    BUN 24 04/30/2024 12:32 PM    CREATININE 1.4 04/30/2024 12:32 PM    CALCIUM 10.0 04/30/2024 12:32 PM    GFRAA >60 07/07/2022 07:25 AM    LABGLOM 52 07/07/2022 07:25 AM    GLUCOSE 166 04/30/2024 12:32 PM     PT/INR:    Lab Results   Component Value Date/Time    PROTIME 12.6 01/10/2020 12:19 PM    INR 1.1 01/10/2020 12:19 PM     PTT:    Lab Results   Component Value Date/Time    APTT 22.9 01/10/2020 12:19 PM   [APTT}    Current Inpatient Medications  Current Facility-Administered Medications: hydrALAZINE (APRESOLINE) injection 10 mg, 10 mg, IntraVENous, Q30 Min PRN  labetalol (NORMODYNE;TRANDATE) injection 10 mg, 10 mg, IntraVENous, Q1H PRN  levETIRAcetam (KEPPRA) injection 500 mg, 500 mg, IntraVENous, Q12H  0.9 % sodium chloride infusion, , IntraVENous, Continuous  sodium chloride flush 0.9 % injection 10 mL, 10 mL, IntraVENous, 2 times per day  sodium chloride flush 0.9 % 
[facility]  PROGRESS NOTE  ATTENDING NOTE        TRAUMA  MECHANISM:  mechanical fall    Chief Complaint   Patient presents with    Fall     Patient fell yesterday hitting head. - LOC -thinners. Patients wife states patient refused to be seen yesterday. Patient vomited this morning. Patient wife called PCP office this morning and was told to bring him to ER       HPI  Trauma consult.    Patient is an 89-year-old male with history of DM, CAD, DM, hypothyroidism, COPD presenting to the ED after a fall in his home.  Unwitnessed fall, found on the ground alert, unknown loss of consciousness.  Patient's niece at bedside reporting all history.  Patient is extremely hard of hearing and refuses to wear hearing aids.  Patient was not using his walker and fell to the ground and hit his face.  Niece reports that he lives at home with her daughter.  She denies that he has any baseline dementia although he is extremely hard of hearing so she says this puts him in \"in a bubble\".  Patient has obvious facial ecchymosis and swelling, no other external signs of trauma.  When asked if patient is in pain he replies no.  Able to follow commands, but is not oriented to time.  GCS 14, 1 off for confusion.  Only blood thinner is ASA 81 mg daily.  History of left foot drop from gallbladder surgery per patient's niece.      CODE STATUS was discussed.  Patient is a DNR CCA at this time.  When niece, who is POA, asked if he was a DNR CC she was unaware if he was this or not.  She stated that she would go home to get the paperwork.    Patient Active Problem List   Diagnosis    Hiatal hernia with GERD and esophagitis (EGD 5-23-17: Dr. Dumont)    Sensorineural hearing loss (SNHL) of both ears    Asbestosis (HCC)    Hypothyroidism    Seborrheic dermatitis    ILD (interstitial lung disease) (HCC)    Diabetes mellitus with peripheral vascular disease (HCC)    Chronic renal disease, stage III (HCC) [164587]    Dementia 
OCCUPATIONAL THERAPY INITIAL EVALUATION      [facility] [address] [place], [place]       Date:2024                                                  Patient Name: Chan Xiong  MRN: [id number]  : 1934  Room: [address] [place] [address]    Evaluating OT: Nela Walters, OTR/L #[id number]    Referring Provider::  Rachael Issa DO     Specific Provider Orders/Date: OT evaluation and treatment 24    Diagnosis:  Subdural hematoma (HCC) [S06.5XAA]  SDH (subdural hematoma) (HCC) [S06.5XAA]  Contusion of face, initial encounter [S00.83XA]      Pertinent Medical History:  has a past medical history of Arthritis, COPD (chronic obstructive pulmonary disease) (HCC), Diabetes mellitus (HCC), Hearing aid worn, Hyperlipidemia, and Hypothyroidism.       Precautions:  Fall Risk, cognition, alarm, incontinence.     Assessment of current deficits   [x] Functional mobility   [x]ADLs  [x] Strength               [x]Cognition   [x] Functional transfers   [] IADLs         [x] Safety Awareness   [x]Endurance   [] Fine Coordination              [x] Balance      [] Vision/perception   []Sensation    [x]Gross Motor Coordination  [x] ROM  [] Delirium                   [] Motor Control     OT PLAN OF CARE   OT POC based on physician orders, patient diagnosis and results of clinical assessment    Frequency/Duration 1-3 days/wk for 2 weeks PRN   Specific OT Treatment to include:   * Instruction/training on adapted ADL techniques and AE recommendations to increase functional independence within precautions       * Functional transfer/mobility training/DME recommendations for increased independence, safety, and fall prevention  * Patient/Family education to increase follow through with safety techniques and functional independence  * Cognitive retraining/development of therapeutic activities to improve problem solving, judgement, memory, and attention for increased safety/participation 
Patient with some concerns from SLP for dysphagia on evaluation. I spoke with the patient's caregiver at bedside. She is a nurse. She understands the risk of aspirating and states that the patient is at his baseline. She does not want to pursue further SLP evaluation or MBSS.    Electronically signed by Jasmine Ruano MD on 5/1/2024 at 1:49 PM  
Perfect serve general surgery resident for discharge orders.  
Pt potassium 5.2, notified trauma resident.  
Trauma Tertiary Survey    Admit Date: 4/30/2024  Hospital day 1    CC:  Fall    Alcohol pre-screening:  Men: How many times in the past year have you had 5 or more drinks in a day?   Unable to assess  How much do you drink on a daily basis? Unable to assess    Drug Pre-screening:    How many times in the past year have you used a recreational drug or used a prescription medication for non medical reasons?   Unable to assess    Mood Prescreening:    During the past two weeks, have you been bothered by little interest or pleasure doing things?  Unable to assess  During the past two weeks, have you been bothered by feeling down, depressed or hopeless?  Unable to assess      Scheduled Meds:   levETIRAcetam  500 mg IntraVENous Q12H    sodium chloride flush  10 mL IntraVENous 2 times per day    polyethylene glycol  17 g Oral Daily    insulin lispro  0-4 Units SubCUTAneous TID WC    insulin lispro  0-4 Units SubCUTAneous Nightly    albuterol  2.5 mg Nebulization 4x Daily RT    vitamin D  2,000 Units Oral Daily    finasteride  5 mg Oral QAM    levothyroxine  75 mcg Oral Daily    rosuvastatin  10 mg Oral Daily     Continuous Infusions:   sodium chloride 100 mL/hr at 04/30/24 2339    sodium chloride       PRN Meds:sodium chloride flush, sodium chloride, ondansetron **OR** ondansetron, acetaminophen    Subjective:     Patient unable to follow commands, garbled mumbling speech, only opening eyes to deep pain.     Objective:   Patient Vitals for the past 8 hrs:   BP Temp Temp src Pulse Resp SpO2   04/30/24 2315 (!) 104/54 97.6 °F (36.4 °C) Temporal 68 18 100 %       No intake/output data recorded.  I/O this shift:  In: -   Out: 200 [Urine:200]    Past Medical History:   Diagnosis Date    Arthritis 2015    rheumatoid    COPD (chronic obstructive pulmonary disease) (HCC)     Diabetes mellitus (HCC)     Hearing aid worn     bilateral    Hyperlipidemia     Hypothyroidism 6/15/2021       @[email or web address]@    Radiology:  CT HEAD WO CONTRAST 
PLOF.    Prognosis is Fair+ for reaching above PT goals.    Patient and or family understand(s) diagnosis, prognosis, and plan of care.  Yes    PHYSICAL THERAPY PLAN OF CARE:    PT POC is established based on physician order and patient diagnosis     Referring provider/PT Order:    Start   Ordering Provider    04/30/24 2345  PT evaluation and treat  Start:  04/30/24 2345,   End:  04/30/24 2345,   ONE TIME,   Standing Count:  1 Occurrences,   R         Rachael Issa A, DO      Diagnosis:  Subdural hematoma (HCC) [S06.5XAA]  SDH (subdural hematoma) (HCC) [S06.5XAA]  Contusion of face, initial encounter [S00.83XA]  Specific instructions for next treatment:  Progress activity    Current Treatment Recommendations:     [x] Strengthening to improve independence with functional mobility   [] ROM to improve independence with functional mobility   [x] Balance Training to improve static/dynamic balance and to reduce fall risk  [x] Endurance Training to improve activity tolerance during functional mobility   [x] Transfer Training to improve safety and independence with all functional transfers   [x] Gait Training to improve gait mechanics, endurance and assess need for appropriate assistive device  [] Stair Training in preparation for safe discharge home and/or into the community   [x] Positioning to prevent skin breakdown and contractures  [x] Safety and Education Training   [x] Patient/Caregiver Education   [] HEP  [] Other     PT long term treatment goals are located in above grid    Frequency of treatments: 2-5x/week x 1-2 weeks.    Time in  0931  Time out  1010    Total Treatment Time  23 minutes     Evaluation Time includes thorough review of current medical information, gathering information on past medical history/social history and prior level of function, completion of standardized testing/informal observation of tasks, assessment of data and education on plan of care and goals.    CPT codes:  [x] Low Complexity PT

## 2024-05-01 NOTE — PLAN OF CARE
Problem: Chronic Conditions and Co-morbidities  Goal: Patient's chronic conditions and co-morbidity symptoms are monitored and maintained or improved  Outcome: Progressing     Problem: Discharge Planning  Goal: Discharge to home or other facility with appropriate resources  Outcome: Progressing  Flowsheets (Taken 4/30/2024 1869)  Discharge to home or other facility with appropriate resources: Refer to discharge planning if patient needs post-hospital services based on physician order or complex needs related to functional status, cognitive ability or social support system     Problem: Safety - Adult  Goal: Free from fall injury  Outcome: Progressing     Problem: ABCDS Injury Assessment  Goal: Absence of physical injury  Outcome: Progressing

## 2024-05-01 NOTE — DISCHARGE SUMMARY
lose your balance and fall.  Talk to your doctor if you have numbness in your feet.    Preventing falls at home  Remove raised doorway thresholds, throw rugs, and clutter. Repair loose carpet or raised areas in the floor.  Move furniture and electrical cords to keep them out of walking paths.  Use non-skid floor wax, and wipe up spills right away, especially on ceramic tile floors.  If you use a walker or cane, put rubber tips on it. If you use crutches, clean the bottoms of them regularly with an abrasive pad, such as steel wool.  Keep your house well lit, especially stairways, porches, and outside walkways. Use night-lights in areas such as hallways and washrooms. Add extra light switches or use remote switches (such as switches that go on or off when you clap your hands) to make it easier to turn lights on if you have to get up during the night.  Install sturdy handrails on stairways.  Move items in your cabinets so that the things you use a lot are on the lower shelves (about waist level).  Keep a cordless phone and a flashlight with new batteries by your bed. If possible, put a phone in each of the main rooms of your house, or carry a cell phone in case you fall and cannot reach a phone. Or, you can wear a device around your neck or wrist. You push a button that sends a signal for help.  Wear low-heeled shoes that fit well and give your feet good support. Use footwear with non-skid soles. Check the heels and soles of your shoes for wear. Repair or replace worn heels or soles.  Do not wear socks without shoes on wood floors.  Walk on the grass when the sidewalks are slippery. If you live in an area that gets snow and ice in the winter, sprinkle salt on slippery steps and sidewalks.    Preventing falls in the bath  Install grab bars and non-skid mats inside and outside your shower or tub and near the toilet and sinks.  Use shower chairs and bath benches.  Use a hand-held shower head that will allow you to sit  doctor may give you treatment to undo the effects of the blood thinner. This can help prevent more bleeding in the skull.  Follow-up care is a key part of your treatment and safety. Be sure to make and go to all appointments, and call your doctor if you are having problems. It's also a good idea to know your test results and keep a list of the medicines you take.  How can you care for yourself at home?  For an acute hematoma  Follow your doctor's instructions. The doctor will tell you if you need someone to watch you closely for the next 24 hours or longer.  For a chronic hematoma  Get plenty of sleep at night, and take it easy during the day. Rest is the best way to recover.  Avoid activities that are physically or mentally demanding. These include housework, exercise, paperwork, video games, text messaging, and using the computer. You may need to change your work or school schedule for a while.  Return to your normal activities slowly. Do not try to do too much at once.  For either type of hematoma  Do not drink alcohol until your doctor says it is okay.  Don't drive a car, ride a bike, or operate machinery until your doctor says it's okay.  If you take aspirin or some other blood thinner, be sure to talk to your doctor. The doctor will tell you if and when to start taking this medicine again. Make sure that you understand exactly what your doctor wants you to do.  If you normally take medicine, your doctor will tell you if and when you can restart it. The doctor will also give you instructions about taking any new medicines.  When should you call for help?   Call 911 anytime you think you may need emergency care. For example, call if:    You have a seizure.     You passed out (lost consciousness).     You are confused or can't stay awake.   Call your doctor now or seek immediate medical care if:    You have new or worse vomiting.     You feel less alert.     You have new weakness or numbness in any part of your

## 2024-05-02 ENCOUNTER — CARE COORDINATION (OUTPATIENT)
Dept: CARE COORDINATION | Age: 89
End: 2024-05-02

## 2024-05-02 LAB
EKG ATRIAL RATE: 82 BPM
EKG P AXIS: 72 DEGREES
EKG P-R INTERVAL: 198 MS
EKG Q-T INTERVAL: 368 MS
EKG QRS DURATION: 80 MS
EKG QTC CALCULATION (BAZETT): 429 MS
EKG R AXIS: -43 DEGREES
EKG T AXIS: 54 DEGREES
EKG VENTRICULAR RATE: 82 BPM

## 2024-05-02 PROCEDURE — 93010 ELECTROCARDIOGRAM REPORT: CPT | Performed by: INTERNAL MEDICINE

## 2024-05-02 NOTE — CARE COORDINATION
Care Transitions Note    Initial Call - Call within 2 business days of discharge: Yes     First attempt to reach the patient for initial affiliate provider Care Transition call post hospital discharge. HIPAA compliant message left for niece Nela Rao (verified on HIPAA) with CTN's contact information requesting return phone call.      Outreach Attempts:   HIPAA compliant voicemail left for patient.     Patient: Chan Xiong    Patient : 1934   MRN: [id number]    Reason for Admission: fall, SDH   Discharge Date: 24  RURS: 11  Last Discharge Facility       Date Complaint Diagnosis Description Type Department Provider    24 Fall SDH (subdural hematoma) (HCC) ... ED to Hosp-Admission (Discharged) (ADMITTED) [facility] Milton Gamble MD; Anne-Marie-Sarai...   Follow Up Appointment:   Future Appointments         Provider Specialty Dept Phone    6/3/2024 11:00 AM Jasmine Ca PA Neurosurgery [phone]    2024 1:20 PM Nela Valenzuela, APRN - CNP Pulmonology [phone]    10/9/2024 11:15 AM Luc Teresa, DO Family Medicine [phone]        Kourtney Cates RN

## 2024-05-03 ENCOUNTER — CARE COORDINATION (OUTPATIENT)
Dept: CARE COORDINATION | Age: 89
End: 2024-05-03

## 2024-05-03 NOTE — CARE COORDINATION
Care Transitions Note    Initial Call - Call within 2 business days of discharge: Yes     Second and final attempt to reach the patient for initial affiliate provider Care Transition call post hospital discharge. HIPAA compliant message left for clayton Rao (verified on HIPAA) with CTN's contact information requesting return phone call.     If no return call by the end of today, CTN will sign off and resolve CT program.      Patient: Chan Xiong    Patient : 1934   MRN: [id number]    Reason for Admission: fall, SDH   Discharge Date: 24  RURS: 11  Last Discharge Facility       Date Complaint Diagnosis Description Type Department Provider    24 Fall SDH (subdural hematoma) (HCC) ... ED to Hosp-Admission (Discharged) (ADMITTED) [facility] Milton Gamble MD; Yaniv...   Follow Up Appointment:   Future Appointments         Provider Specialty Dept Phone    6/3/2024 11:00 AM Jasmine Ca PA Neurosurgery [phone]    2024 1:20 PM Nela Valenzuela, APRN - CNP Pulmonology [phone]    10/9/2024 11:15 AM Luc Teresa, DO Family Medicine [phone]        Kourtney Cates RN

## 2024-06-06 RX ORDER — FINASTERIDE 5 MG/1
5 TABLET, FILM COATED ORAL EVERY MORNING
Qty: 30 TABLET | Refills: 0 | OUTPATIENT
Start: 2024-06-06

## 2024-06-10 ENCOUNTER — HOSPITAL ENCOUNTER (OUTPATIENT)
Dept: CT IMAGING | Age: 89
Discharge: HOME | End: 2024-06-12
Attending: NEUROLOGICAL SURGERY
Payer: MEDICARE

## 2024-06-10 ENCOUNTER — OFFICE VISIT (OUTPATIENT)
Dept: NEUROSURGERY | Age: 89
End: 2024-06-10
Payer: MEDICARE

## 2024-06-10 DIAGNOSIS — S06.5XAA SDH (SUBDURAL HEMATOMA) (HCC): Primary | ICD-10-CM

## 2024-06-10 DIAGNOSIS — S06.5XAA SDH (SUBDURAL HEMATOMA) (HCC): ICD-10-CM

## 2024-06-10 PROCEDURE — 1123F ACP DISCUSS/DSCN MKR DOCD: CPT | Performed by: STUDENT IN AN ORGANIZED HEALTH CARE EDUCATION/TRAINING PROGRAM

## 2024-06-10 PROCEDURE — 99213 OFFICE O/P EST LOW 20 MIN: CPT | Performed by: STUDENT IN AN ORGANIZED HEALTH CARE EDUCATION/TRAINING PROGRAM

## 2024-06-10 PROCEDURE — 99213 OFFICE O/P EST LOW 20 MIN: CPT

## 2024-06-10 PROCEDURE — 70450 CT HEAD/BRAIN W/O DYE: CPT

## 2024-06-10 NOTE — PROGRESS NOTES
Hospital Follow-up     This is a 89 year old male who presents to the office for a 1 month follow-up s/p left SDH     Subjective: Patient states he is doing well. He denies any headaches. No numbness or weakness. No other complaints. Has remained off his ASA 81mg. CT head reviewed.      Physical Exam:              WDWN, no apparent distress              Non-labored breathing               Vitals Stable              Alert and oriented x3              CN 3-12 intact              PERRL              EOMI              CUMMINGS well              Motor strength symmetric              Sensation to LT intact bilaterally   (-)Drift                Imagin/10/2024 CT Head  No acute hemorrhage noted-final read pending.      Assessment: This is a 89 y.o.  male presenting for a 1 month follow-up s/p left SDH.      Plan:  -CT Head reviewed and discussed in detail  -No restrictions, okay to resume AP/AC medications  -OARRS report reviewed   -Follow-up in neurosurgery clinic prn  -Call or return to neurosurgery office sooner if symptoms worsen or if new issues arise in the interim.    Electronically signed by Jasmine Ca PA-C on 6/10/2024 at 10:03 AM

## 2024-09-30 ENCOUNTER — HOSPITAL ENCOUNTER (OUTPATIENT)
Age: 89
Discharge: HOME | End: 2024-09-30
Payer: MEDICARE

## 2024-09-30 DIAGNOSIS — E11.51 DIABETES MELLITUS WITH PERIPHERAL VASCULAR DISEASE (HCC): ICD-10-CM

## 2024-09-30 LAB
ANION GAP SERPL CALCULATED.3IONS-SCNC: 15 MMOL/L (ref 7–16)
BUN SERPL-MCNC: 20 MG/DL (ref 6–23)
CALCIUM SERPL-MCNC: 9.9 MG/DL (ref 8.6–10.2)
CHLORIDE SERPL-SCNC: 105 MMOL/L (ref 98–107)
CHOLEST SERPL-MCNC: 124 MG/DL
CO2 SERPL-SCNC: 28 MMOL/L (ref 22–29)
CREAT SERPL-MCNC: 1.6 MG/DL (ref 0.7–1.2)
GFR, ESTIMATED: 41 ML/MIN/1.73M2
GLUCOSE SERPL-MCNC: 119 MG/DL (ref 74–99)
HBA1C MFR BLD: 7 % (ref 4–5.6)
HDLC SERPL-MCNC: 54 MG/DL
LDLC SERPL CALC-MCNC: 46 MG/DL
POTASSIUM SERPL-SCNC: 4.8 MMOL/L (ref 3.5–5)
SODIUM SERPL-SCNC: 148 MMOL/L (ref 132–146)
TRIGL SERPL-MCNC: 119 MG/DL
VLDLC SERPL CALC-MCNC: 24 MG/DL

## 2024-09-30 PROCEDURE — 83036 HEMOGLOBIN GLYCOSYLATED A1C: CPT

## 2024-09-30 PROCEDURE — 36415 COLL VENOUS BLD VENIPUNCTURE: CPT

## 2024-09-30 PROCEDURE — 80061 LIPID PANEL: CPT

## 2024-09-30 PROCEDURE — 80048 BASIC METABOLIC PNL TOTAL CA: CPT

## 2024-12-04 ENCOUNTER — HOSPITAL ENCOUNTER (OUTPATIENT)
Age: 88
Discharge: HOME | End: 2024-12-06
Payer: MEDICARE

## 2024-12-04 ENCOUNTER — HOSPITAL ENCOUNTER (OUTPATIENT)
Dept: GENERAL RADIOLOGY | Age: 88
Discharge: HOME | End: 2024-12-06
Payer: MEDICARE

## 2024-12-04 DIAGNOSIS — R06.02 SOB (SHORTNESS OF BREATH): ICD-10-CM

## 2024-12-04 DIAGNOSIS — J84.9 INTERSTITIAL LUNG DISEASE (HCC): ICD-10-CM

## 2024-12-04 PROCEDURE — 71046 X-RAY EXAM CHEST 2 VIEWS: CPT
